# Patient Record
Sex: MALE | Race: WHITE | NOT HISPANIC OR LATINO | Employment: UNEMPLOYED | URBAN - NONMETROPOLITAN AREA
[De-identification: names, ages, dates, MRNs, and addresses within clinical notes are randomized per-mention and may not be internally consistent; named-entity substitution may affect disease eponyms.]

---

## 2017-04-01 ENCOUNTER — HOSPITAL ENCOUNTER (EMERGENCY)
Facility: HOSPITAL | Age: 42
Discharge: HOME | End: 2017-04-01
Attending: FAMILY MEDICINE | Admitting: NURSE PRACTITIONER

## 2017-04-01 VITALS
DIASTOLIC BLOOD PRESSURE: 103 MMHG | HEART RATE: 85 BPM | HEIGHT: 72 IN | RESPIRATION RATE: 18 BRPM | OXYGEN SATURATION: 99 % | BODY MASS INDEX: 38.6 KG/M2 | WEIGHT: 285 LBS | SYSTOLIC BLOOD PRESSURE: 151 MMHG | TEMPERATURE: 98.3 F

## 2017-04-01 DIAGNOSIS — M54.42 CHRONIC MIDLINE LOW BACK PAIN WITH BILATERAL SCIATICA: Primary | ICD-10-CM

## 2017-04-01 DIAGNOSIS — G89.29 CHRONIC MIDLINE LOW BACK PAIN WITH BILATERAL SCIATICA: Primary | ICD-10-CM

## 2017-04-01 DIAGNOSIS — M54.41 CHRONIC MIDLINE LOW BACK PAIN WITH BILATERAL SCIATICA: Primary | ICD-10-CM

## 2017-04-01 LAB
ALBUMIN SERPL-MCNC: 4.6 G/DL (ref 3.5–5)
ALBUMIN/GLOB SERPL: 1.6 G/DL (ref 1.5–2.5)
ALP SERPL-CCNC: 57 U/L (ref 40–129)
ALT SERPL W P-5'-P-CCNC: 25 U/L (ref 10–44)
AMPHET+METHAMPHET UR QL: NEGATIVE
ANION GAP SERPL CALCULATED.3IONS-SCNC: 4.5 MMOL/L (ref 3.6–11.2)
AST SERPL-CCNC: 20 U/L (ref 10–34)
BARBITURATES UR QL SCN: NEGATIVE
BASOPHILS # BLD AUTO: 0.03 10*3/MM3 (ref 0–0.3)
BASOPHILS NFR BLD AUTO: 0.3 % (ref 0–2)
BENZODIAZ UR QL SCN: NEGATIVE
BILIRUB SERPL-MCNC: 0.7 MG/DL (ref 0.2–1.8)
BILIRUB UR QL STRIP: NEGATIVE
BUN BLD-MCNC: 10 MG/DL (ref 7–21)
BUN/CREAT SERPL: 9.2 (ref 7–25)
BUPRENORPHINE+NOR UR QL SCN: NEGATIVE
CALCIUM SPEC-SCNC: 9.2 MG/DL (ref 7.7–10)
CANNABINOIDS SERPL QL: NEGATIVE
CHLORIDE SERPL-SCNC: 104 MMOL/L (ref 99–112)
CLARITY UR: CLEAR
CO2 SERPL-SCNC: 28.5 MMOL/L (ref 24.3–31.9)
COCAINE UR QL: NEGATIVE
COLOR UR: YELLOW
CREAT BLD-MCNC: 1.09 MG/DL (ref 0.43–1.29)
DEPRECATED RDW RBC AUTO: 42.7 FL (ref 37–54)
EOSINOPHIL # BLD AUTO: 0.11 10*3/MM3 (ref 0–0.7)
EOSINOPHIL NFR BLD AUTO: 1.1 % (ref 0–5)
ERYTHROCYTE [DISTWIDTH] IN BLOOD BY AUTOMATED COUNT: 13.1 % (ref 11.5–14.5)
GFR SERPL CREATININE-BSD FRML MDRD: 75 ML/MIN/1.73
GLOBULIN UR ELPH-MCNC: 2.9 GM/DL
GLUCOSE BLD-MCNC: 175 MG/DL (ref 70–110)
GLUCOSE UR STRIP-MCNC: ABNORMAL MG/DL
HCT VFR BLD AUTO: 50.4 % (ref 42–52)
HGB BLD-MCNC: 17.1 G/DL (ref 14–18)
HGB UR QL STRIP.AUTO: NEGATIVE
IMM GRANULOCYTES # BLD: 0.07 10*3/MM3 (ref 0–0.03)
IMM GRANULOCYTES NFR BLD: 0.7 % (ref 0–0.5)
KETONES UR QL STRIP: ABNORMAL
LEUKOCYTE ESTERASE UR QL STRIP.AUTO: NEGATIVE
LYMPHOCYTES # BLD AUTO: 2.74 10*3/MM3 (ref 1–3)
LYMPHOCYTES NFR BLD AUTO: 26.7 % (ref 21–51)
MCH RBC QN AUTO: 30.6 PG (ref 27–33)
MCHC RBC AUTO-ENTMCNC: 33.9 G/DL (ref 33–37)
MCV RBC AUTO: 90.2 FL (ref 80–94)
METHADONE UR QL SCN: NEGATIVE
MONOCYTES # BLD AUTO: 1.23 10*3/MM3 (ref 0.1–0.9)
MONOCYTES NFR BLD AUTO: 12 % (ref 0–10)
NEUTROPHILS # BLD AUTO: 6.07 10*3/MM3 (ref 1.4–6.5)
NEUTROPHILS NFR BLD AUTO: 59.2 % (ref 30–70)
NITRITE UR QL STRIP: NEGATIVE
OPIATES UR QL: NEGATIVE
OSMOLALITY SERPL CALC.SUM OF ELEC: 277.1 MOSM/KG (ref 273–305)
OXYCODONE UR QL SCN: NEGATIVE
PCP UR QL SCN: NEGATIVE
PH UR STRIP.AUTO: 5.5 [PH] (ref 5–8)
PLATELET # BLD AUTO: 219 10*3/MM3 (ref 130–400)
PMV BLD AUTO: 10.9 FL (ref 6–10)
POTASSIUM BLD-SCNC: 3.8 MMOL/L (ref 3.5–5.3)
PROPOXYPH UR QL: NEGATIVE
PROT SERPL-MCNC: 7.5 G/DL (ref 6–8)
PROT UR QL STRIP: NEGATIVE
RBC # BLD AUTO: 5.59 10*6/MM3 (ref 4.7–6.1)
SODIUM BLD-SCNC: 137 MMOL/L (ref 135–153)
SP GR UR STRIP: >1.03 (ref 1–1.03)
UROBILINOGEN UR QL STRIP: ABNORMAL
WBC NRBC COR # BLD: 10.25 10*3/MM3 (ref 4.5–12.5)

## 2017-04-01 PROCEDURE — 85025 COMPLETE CBC W/AUTO DIFF WBC: CPT | Performed by: NURSE PRACTITIONER

## 2017-04-01 PROCEDURE — 81003 URINALYSIS AUTO W/O SCOPE: CPT | Performed by: NURSE PRACTITIONER

## 2017-04-01 PROCEDURE — 99283 EMERGENCY DEPT VISIT LOW MDM: CPT

## 2017-04-01 PROCEDURE — 80307 DRUG TEST PRSMV CHEM ANLYZR: CPT | Performed by: NURSE PRACTITIONER

## 2017-04-01 PROCEDURE — 25010000002 ORPHENADRINE CITRATE PER 60 MG: Performed by: NURSE PRACTITIONER

## 2017-04-01 PROCEDURE — 80053 COMPREHEN METABOLIC PANEL: CPT | Performed by: NURSE PRACTITIONER

## 2017-04-01 PROCEDURE — 25010000002 KETOROLAC TROMETHAMINE PER 15 MG: Performed by: NURSE PRACTITIONER

## 2017-04-01 PROCEDURE — 96372 THER/PROPH/DIAG INJ SC/IM: CPT

## 2017-04-01 RX ORDER — PAROXETINE HYDROCHLORIDE 20 MG/1
40 TABLET, FILM COATED ORAL EVERY MORNING
Status: ON HOLD | COMMUNITY
End: 2018-04-03

## 2017-04-01 RX ORDER — ONDANSETRON 4 MG/1
4 TABLET, ORALLY DISINTEGRATING ORAL ONCE
Status: COMPLETED | OUTPATIENT
Start: 2017-04-01 | End: 2017-04-01

## 2017-04-01 RX ORDER — BUPRENORPHINE 2 MG/1
2 TABLET SUBLINGUAL DAILY
Status: ON HOLD | COMMUNITY
End: 2018-04-03

## 2017-04-01 RX ORDER — HYDROCODONE BITARTRATE AND ACETAMINOPHEN 10; 325 MG/1; MG/1
1 TABLET ORAL ONCE
Status: COMPLETED | OUTPATIENT
Start: 2017-04-01 | End: 2017-04-01

## 2017-04-01 RX ORDER — KETOROLAC TROMETHAMINE 30 MG/ML
60 INJECTION, SOLUTION INTRAMUSCULAR; INTRAVENOUS ONCE
Status: COMPLETED | OUTPATIENT
Start: 2017-04-01 | End: 2017-04-01

## 2017-04-01 RX ORDER — ORPHENADRINE CITRATE 30 MG/ML
60 INJECTION INTRAMUSCULAR; INTRAVENOUS ONCE
Status: COMPLETED | OUTPATIENT
Start: 2017-04-01 | End: 2017-04-01

## 2017-04-01 RX ORDER — HYDROCODONE BITARTRATE AND ACETAMINOPHEN 7.5; 325 MG/1; MG/1
1 TABLET ORAL EVERY 6 HOURS PRN
Qty: 10 TABLET | Refills: 0 | Status: SHIPPED | OUTPATIENT
Start: 2017-04-01 | End: 2018-03-21

## 2017-04-01 RX ORDER — METHOCARBAMOL 100 MG/ML
500 INJECTION, SOLUTION INTRAMUSCULAR; INTRAVENOUS ONCE
Status: DISCONTINUED | OUTPATIENT
Start: 2017-04-01 | End: 2017-04-01

## 2017-04-01 RX ADMIN — KETOROLAC TROMETHAMINE 60 MG: 60 INJECTION, SOLUTION INTRAMUSCULAR at 14:06

## 2017-04-01 RX ADMIN — ONDANSETRON 4 MG: 4 TABLET, ORALLY DISINTEGRATING ORAL at 15:07

## 2017-04-01 RX ADMIN — HYDROCODONE BITARTRATE AND ACETAMINOPHEN 1 TABLET: 10; 325 TABLET ORAL at 15:09

## 2017-04-01 RX ADMIN — ORPHENADRINE CITRATE 60 MG: 30 INJECTION INTRAMUSCULAR; INTRAVENOUS at 14:08

## 2017-04-01 NOTE — DISCHARGE INSTRUCTIONS

## 2017-04-03 NOTE — ED PROVIDER NOTES
Subjective   HPI Comments: Patient reports that he had spinal injections a few days ago, since then his pain has been intolerable.  He has an appointment at the pain clinic on Monday to get started on a new regimen for his pain management.      Patient is a 41 y.o. male presenting with back pain.   History provided by:  Patient  Back Pain   Location:  Lumbar spine  Quality:  Shooting and stabbing  Radiates to:  L thigh and R thigh  Pain severity:  Severe  Pain is:  Same all the time  Timing:  Constant  Progression:  Waxing and waning  Chronicity:  Chronic  Context comment:  Recent spinal injections.   Relieved by:  Nothing  Worsened by:  Movement  Ineffective treatments:  Ibuprofen, lying down, heating pad, bed rest, OTC medications and NSAIDs  Associated symptoms: no abdominal pain, no chest pain, no dysuria and no fever        Review of Systems   Constitutional: Negative.  Negative for fever.   HENT: Negative.    Respiratory: Negative.    Cardiovascular: Negative.  Negative for chest pain.   Gastrointestinal: Negative.  Negative for abdominal pain.   Endocrine: Negative.    Genitourinary: Negative.  Negative for dysuria.   Musculoskeletal: Positive for back pain.   Skin: Negative.    Neurological: Negative.    Psychiatric/Behavioral: Negative.    All other systems reviewed and are negative.      Past Medical History:   Diagnosis Date   • Arthritis    • Asthma    • Hyperlipidemia    • Hypertension    • Injury of back    • Kidney stone    • Migraine    • Pancreatitis        Allergies   Allergen Reactions   • Allopurinol        Past Surgical History:   Procedure Laterality Date   • COSMETIC SURGERY     • DENTAL PROCEDURE     • FRACTURE SURGERY      left arm humerous bone        Family History   Problem Relation Age of Onset   • No Known Problems Mother    • No Known Problems Father        Social History     Social History   • Marital status: Single     Spouse name: N/A   • Number of children: N/A   • Years of  education: N/A     Social History Main Topics   • Smoking status: Never Smoker   • Smokeless tobacco: Former User   • Alcohol use No   • Drug use: No   • Sexual activity: Yes     Partners: Female     Other Topics Concern   • None     Social History Narrative       Objective   Physical Exam   Constitutional: He is oriented to person, place, and time. He appears well-developed and well-nourished. No distress.   HENT:   Head: Normocephalic and atraumatic.   Right Ear: External ear normal.   Left Ear: External ear normal.   Nose: Nose normal.   Eyes: Conjunctivae and EOM are normal. Pupils are equal, round, and reactive to light.   Neck: Normal range of motion. Neck supple. No JVD present. No tracheal deviation present.   Cardiovascular: Normal rate, regular rhythm and normal heart sounds.    No murmur heard.  Pulmonary/Chest: Effort normal and breath sounds normal. No respiratory distress. He has no wheezes.   Abdominal: Soft. Bowel sounds are normal. There is no tenderness.   Musculoskeletal: Normal range of motion. He exhibits no edema or deformity.   Neurological: He is alert and oriented to person, place, and time. No cranial nerve deficit.   Skin: Skin is warm and dry. No rash noted. He is not diaphoretic. No erythema. No pallor.   Psychiatric: He has a normal mood and affect. His behavior is normal. Thought content normal.   Nursing note and vitals reviewed.      Procedures         ED Course  ED Course            MDM  Number of Diagnoses or Management Options  Chronic midline low back pain with bilateral sciatica: established and worsening     Amount and/or Complexity of Data Reviewed  Clinical lab tests: ordered and reviewed    Risk of Complications, Morbidity, and/or Mortality  Presenting problems: moderate  Diagnostic procedures: moderate  Management options: low        Final diagnoses:   Chronic midline low back pain with bilateral sciatica            Jody Gregg, APRN  04/02/17 0798

## 2017-04-27 ENCOUNTER — HOSPITAL ENCOUNTER (EMERGENCY)
Facility: HOSPITAL | Age: 42
Discharge: HOME | End: 2017-04-27
Attending: EMERGENCY MEDICINE | Admitting: EMERGENCY MEDICINE

## 2017-04-27 VITALS
DIASTOLIC BLOOD PRESSURE: 90 MMHG | OXYGEN SATURATION: 94 % | HEART RATE: 89 BPM | SYSTOLIC BLOOD PRESSURE: 141 MMHG | BODY MASS INDEX: 39.28 KG/M2 | WEIGHT: 290 LBS | HEIGHT: 72 IN | TEMPERATURE: 97.1 F | RESPIRATION RATE: 18 BRPM

## 2017-04-27 DIAGNOSIS — M54.42 CHRONIC MIDLINE LOW BACK PAIN WITH BILATERAL SCIATICA: Primary | ICD-10-CM

## 2017-04-27 DIAGNOSIS — M54.41 CHRONIC MIDLINE LOW BACK PAIN WITH BILATERAL SCIATICA: Primary | ICD-10-CM

## 2017-04-27 DIAGNOSIS — G89.29 CHRONIC MIDLINE LOW BACK PAIN WITH BILATERAL SCIATICA: Primary | ICD-10-CM

## 2017-04-27 PROCEDURE — 96372 THER/PROPH/DIAG INJ SC/IM: CPT

## 2017-04-27 PROCEDURE — 99283 EMERGENCY DEPT VISIT LOW MDM: CPT

## 2017-04-27 PROCEDURE — 25010000002 METHYLPREDNISOLONE PER 125 MG: Performed by: PHYSICIAN ASSISTANT

## 2017-04-27 PROCEDURE — 25010000002 KETOROLAC TROMETHAMINE PER 15 MG: Performed by: PHYSICIAN ASSISTANT

## 2017-04-27 RX ORDER — KETOROLAC TROMETHAMINE 30 MG/ML
60 INJECTION, SOLUTION INTRAMUSCULAR; INTRAVENOUS ONCE
Status: COMPLETED | OUTPATIENT
Start: 2017-04-27 | End: 2017-04-27

## 2017-04-27 RX ORDER — METHYLPREDNISOLONE SODIUM SUCCINATE 125 MG/2ML
125 INJECTION, POWDER, LYOPHILIZED, FOR SOLUTION INTRAMUSCULAR; INTRAVENOUS ONCE
Status: COMPLETED | OUTPATIENT
Start: 2017-04-27 | End: 2017-04-27

## 2017-04-27 RX ORDER — TRAMADOL HYDROCHLORIDE 50 MG/1
50 TABLET ORAL EVERY 6 HOURS PRN
Qty: 15 TABLET | Refills: 0 | Status: SHIPPED | OUTPATIENT
Start: 2017-04-27 | End: 2018-03-21

## 2017-04-27 RX ADMIN — METHYLPREDNISOLONE SODIUM SUCCINATE 125 MG: 125 INJECTION, POWDER, FOR SOLUTION INTRAMUSCULAR; INTRAVENOUS at 12:51

## 2017-04-27 RX ADMIN — KETOROLAC TROMETHAMINE 60 MG: 60 INJECTION, SOLUTION INTRAMUSCULAR at 12:53

## 2017-06-17 ENCOUNTER — HOSPITAL ENCOUNTER (EMERGENCY)
Facility: HOSPITAL | Age: 42
Discharge: HOME | End: 2017-06-17
Attending: EMERGENCY MEDICINE | Admitting: EMERGENCY MEDICINE

## 2017-06-17 VITALS
SYSTOLIC BLOOD PRESSURE: 145 MMHG | DIASTOLIC BLOOD PRESSURE: 90 MMHG | WEIGHT: 280 LBS | BODY MASS INDEX: 37.93 KG/M2 | OXYGEN SATURATION: 98 % | TEMPERATURE: 98 F | HEIGHT: 72 IN | RESPIRATION RATE: 18 BRPM | HEART RATE: 80 BPM

## 2017-06-17 DIAGNOSIS — F41.1 GENERALIZED ANXIETY DISORDER: Primary | ICD-10-CM

## 2017-06-17 PROCEDURE — 99283 EMERGENCY DEPT VISIT LOW MDM: CPT

## 2017-06-17 RX ORDER — LORAZEPAM 1 MG/1
1 TABLET ORAL ONCE
Status: COMPLETED | OUTPATIENT
Start: 2017-06-17 | End: 2017-06-17

## 2017-06-17 RX ORDER — HYDROXYZINE PAMOATE 50 MG/1
50 CAPSULE ORAL 3 TIMES DAILY PRN
Qty: 30 CAPSULE | Refills: 0 | Status: ON HOLD | OUTPATIENT
Start: 2017-06-17 | End: 2018-04-03

## 2017-06-17 RX ADMIN — LORAZEPAM 1 MG: 1 TABLET ORAL at 13:43

## 2017-06-17 NOTE — ED PROVIDER NOTES
Subjective   Patient is a 42 y.o. male presenting with anxiety.   History provided by:  Patient   used: No    Anxiety   Presents for initial visit. Episode onset: years. The problem has been unchanged. Symptoms include insomnia, irritability, nervous/anxious behavior and restlessness. Patient reports no chest pain, dizziness, nausea or shortness of breath. Symptoms occur most days. The severity of symptoms is moderate. The quality of sleep is fair. Nighttime awakenings: occasional.     There are no known risk factors. His past medical history is significant for anxiety/panic attacks. Treatments tried: has been on klonopin for years, recently had to switch PCP and has not had it in 1 month. Compliance with prior treatments has been good.       Review of Systems   Constitutional: Positive for irritability. Negative for activity change and fever.   HENT: Negative for congestion and sore throat.    Eyes: Negative for pain.   Respiratory: Negative for cough, shortness of breath and wheezing.    Cardiovascular: Negative for chest pain.   Gastrointestinal: Negative for abdominal distention, diarrhea, nausea and vomiting.   Genitourinary: Negative for difficulty urinating and dysuria.   Musculoskeletal: Negative for arthralgias and myalgias.   Skin: Negative for rash and wound.   Neurological: Negative for dizziness and headaches.   Psychiatric/Behavioral: Negative for agitation. The patient is nervous/anxious and has insomnia.    All other systems reviewed and are negative.      Past Medical History:   Diagnosis Date   • Arthritis    • Asthma    • Hyperlipidemia    • Hypertension    • Injury of back    • Kidney stone    • Migraine    • Pancreatitis        Allergies   Allergen Reactions   • Allopurinol        Past Surgical History:   Procedure Laterality Date   • COSMETIC SURGERY     • DENTAL PROCEDURE     • FRACTURE SURGERY      left arm humerous bone        Family History   Problem Relation Age of  Onset   • No Known Problems Mother    • No Known Problems Father        Social History     Social History   • Marital status: Single     Spouse name: N/A   • Number of children: N/A   • Years of education: N/A     Social History Main Topics   • Smoking status: Never Smoker   • Smokeless tobacco: Current User     Types: Snuff   • Alcohol use No   • Drug use: No   • Sexual activity: Yes     Partners: Female     Other Topics Concern   • None     Social History Narrative           Objective   Physical Exam   Constitutional: He is oriented to person, place, and time. He appears well-developed and well-nourished.   HENT:   Head: Normocephalic and atraumatic.   Eyes: EOM are normal. Pupils are equal, round, and reactive to light.   Neck: Normal range of motion. Neck supple.   Cardiovascular: Normal rate, regular rhythm and normal heart sounds.    Pulmonary/Chest: Effort normal and breath sounds normal.   Abdominal: Soft. Bowel sounds are normal.   Musculoskeletal: Normal range of motion.   Neurological: He is alert and oriented to person, place, and time.   Skin: Skin is warm and dry.   Psychiatric: He has a normal mood and affect. His behavior is normal. Judgment and thought content normal.   Nursing note and vitals reviewed.      Procedures         ED Course  ED Course                  MDM  Number of Diagnoses or Management Options  Generalized anxiety disorder:      Amount and/or Complexity of Data Reviewed  Clinical lab tests: ordered and reviewed  Tests in the radiology section of CPT®: reviewed and ordered  Tests in the medicine section of CPT®: reviewed and ordered    Patient Progress  Patient progress: stable      Final diagnoses:   Generalized anxiety disorder            ALO Martini  06/17/17 4774

## 2017-06-17 NOTE — ED NOTES
Amb to Rm 407 with c/o anxious feeling, states has no Klonopin x 1 month d/t changing MD's and insurance changes and has an appointment with Psychiatrist on June 22nd; c/o sleep walking last night and fall. Calm and cooperative at this time.     Meka Albarran RN  06/17/17 3525

## 2018-03-21 ENCOUNTER — HOSPITAL ENCOUNTER (EMERGENCY)
Facility: HOSPITAL | Age: 43
Discharge: HOME | End: 2018-03-21
Attending: EMERGENCY MEDICINE | Admitting: EMERGENCY MEDICINE

## 2018-03-21 VITALS
HEIGHT: 72 IN | DIASTOLIC BLOOD PRESSURE: 99 MMHG | HEART RATE: 101 BPM | BODY MASS INDEX: 39.28 KG/M2 | RESPIRATION RATE: 18 BRPM | TEMPERATURE: 97 F | WEIGHT: 290 LBS | SYSTOLIC BLOOD PRESSURE: 150 MMHG | OXYGEN SATURATION: 99 %

## 2018-03-21 DIAGNOSIS — M1A.0721 IDIOPATHIC CHRONIC GOUT OF LEFT FOOT WITH TOPHUS: Primary | ICD-10-CM

## 2018-03-21 PROCEDURE — 25010000002 METHYLPREDNISOLONE PER 125 MG: Performed by: PHYSICIAN ASSISTANT

## 2018-03-21 PROCEDURE — 25010000002 KETOROLAC TROMETHAMINE PER 15 MG: Performed by: PHYSICIAN ASSISTANT

## 2018-03-21 PROCEDURE — 99283 EMERGENCY DEPT VISIT LOW MDM: CPT

## 2018-03-21 PROCEDURE — 96372 THER/PROPH/DIAG INJ SC/IM: CPT

## 2018-03-21 RX ORDER — HYDROCODONE BITARTRATE AND ACETAMINOPHEN 7.5; 325 MG/1; MG/1
1 TABLET ORAL EVERY 6 HOURS PRN
Qty: 10 TABLET | Refills: 0 | Status: ON HOLD | OUTPATIENT
Start: 2018-03-21 | End: 2018-04-03

## 2018-03-21 RX ORDER — PREDNISONE 20 MG/1
20 TABLET ORAL 3 TIMES DAILY
Qty: 15 TABLET | Refills: 0 | Status: SHIPPED | OUTPATIENT
Start: 2018-03-21 | End: 2018-03-26

## 2018-03-21 RX ORDER — INDOMETHACIN 25 MG/1
25 CAPSULE ORAL 3 TIMES DAILY PRN
Qty: 10 CAPSULE | Refills: 0 | Status: ON HOLD | OUTPATIENT
Start: 2018-03-21 | End: 2018-04-03

## 2018-03-21 RX ORDER — METHYLPREDNISOLONE SODIUM SUCCINATE 125 MG/2ML
125 INJECTION, POWDER, LYOPHILIZED, FOR SOLUTION INTRAMUSCULAR; INTRAVENOUS ONCE
Status: COMPLETED | OUTPATIENT
Start: 2018-03-21 | End: 2018-03-21

## 2018-03-21 RX ORDER — HYDROCODONE BITARTRATE AND ACETAMINOPHEN 7.5; 325 MG/1; MG/1
1 TABLET ORAL EVERY 6 HOURS PRN
Qty: 10 TABLET | Refills: 0 | Status: SHIPPED | OUTPATIENT
Start: 2018-03-21 | End: 2018-03-21

## 2018-03-21 RX ORDER — KETOROLAC TROMETHAMINE 10 MG/1
10 TABLET, FILM COATED ORAL EVERY 6 HOURS PRN
Qty: 10 TABLET | Refills: 0 | Status: SHIPPED | OUTPATIENT
Start: 2018-03-21 | End: 2018-03-21

## 2018-03-21 RX ORDER — KETOROLAC TROMETHAMINE 30 MG/ML
60 INJECTION, SOLUTION INTRAMUSCULAR; INTRAVENOUS ONCE
Status: COMPLETED | OUTPATIENT
Start: 2018-03-21 | End: 2018-03-21

## 2018-03-21 RX ADMIN — KETOROLAC TROMETHAMINE 60 MG: 60 INJECTION, SOLUTION INTRAMUSCULAR at 13:21

## 2018-03-21 RX ADMIN — METHYLPREDNISOLONE SODIUM SUCCINATE 125 MG: 125 INJECTION, POWDER, FOR SOLUTION INTRAMUSCULAR; INTRAVENOUS at 13:23

## 2018-03-21 NOTE — ED PROVIDER NOTES
Subjective   This is a 42-year-old male comes in with chief complaint left foot pain in mainly for the past 4 days.  Patient does state he has a history of gout.  States he is seeing Dr. 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  Does has surgery scheduled in with one week.  Patient describes pain as throbbing, pressure.  Does have a history of hypertension, hyperlipidemia, pancreatitis, arthritis.        History provided by:  Patient   used: No    Lower Extremity Issue   Location:  Foot  Time since incident:  2 days  Injury: no    Foot location:  L foot  Pain details:     Quality:  Aching, shooting and throbbing    Severity:  Moderate    Onset quality:  Sudden    Duration:  2 days    Timing:  Intermittent    Progression:  Worsening  Chronicity:  New  Dislocation: no    Foreign body present:  No foreign bodies  Tetanus status:  Unknown  Prior injury to area:  No  Relieved by:  Nothing  Worsened by:  Nothing  Ineffective treatments:  None tried  Associated symptoms: decreased ROM, stiffness and swelling    Associated symptoms: no itching and no numbness    Risk factors: no concern for non-accidental trauma, no frequent fractures, no obesity and no recent illness        Review of Systems   Musculoskeletal: Positive for arthralgias, joint swelling, myalgias and stiffness.   Skin: Negative for itching, rash and wound.   All other systems reviewed and are negative.      Past Medical History:   Diagnosis Date   • Arthritis    • Asthma    • Hyperlipidemia    • Hypertension    • Injury of back    • Kidney stone    • Migraine    • Pancreatitis        Allergies   Allergen Reactions   • Allopurinol Rash       Past Surgical History:   Procedure Laterality Date   • COSMETIC SURGERY     • DENTAL PROCEDURE     • FRACTURE SURGERY      left arm humerous bone        Family History   Problem Relation Age of Onset   • No Known Problems Mother    • No Known Problems Father        Social History     Social  History   • Marital status: Single     Social History Main Topics   • Smoking status: Never Smoker   • Smokeless tobacco: Current User     Types: Snuff   • Alcohol use No   • Drug use: No   • Sexual activity: Yes     Partners: Female     Other Topics Concern   • Not on file           Objective   Physical Exam   Constitutional: He is oriented to person, place, and time. He appears well-developed and well-nourished.   HENT:   Head: Normocephalic.   Right Ear: External ear normal.   Left Ear: External ear normal.   Nose: Nose normal.   Mouth/Throat: Oropharynx is clear and moist.   Eyes: Conjunctivae and EOM are normal. Pupils are equal, round, and reactive to light. Right eye exhibits no discharge. Left eye exhibits no discharge.   Neck: Normal range of motion. Neck supple. No thyromegaly present.   Cardiovascular: Normal rate, regular rhythm, normal heart sounds and intact distal pulses.    Pulmonary/Chest: Effort normal and breath sounds normal.   Abdominal: Soft. Bowel sounds are normal.   Musculoskeletal: He exhibits tenderness.        Left ankle: He exhibits swelling and ecchymosis. Tenderness. Medial malleolus tenderness found.        Lymphadenopathy:     He has no cervical adenopathy.   Neurological: He is alert and oriented to person, place, and time. He has normal reflexes.   Skin: Skin is warm and dry. Capillary refill takes less than 2 seconds. Bruising and ecchymosis noted. He is not diaphoretic.   Psychiatric: He has a normal mood and affect. His behavior is normal. Judgment and thought content normal.   Nursing note and vitals reviewed.      Procedures         ED Course  ED Course   Comment By Time   PAtient kacie report shows subxone. Will treat with steroids.  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

## 2018-04-02 ENCOUNTER — HOSPITAL ENCOUNTER (EMERGENCY)
Facility: HOSPITAL | Age: 43
Discharge: STILL ADMITTED OR ADMINISTRATIVE | End: 2018-04-03
Attending: EMERGENCY MEDICINE

## 2018-04-02 DIAGNOSIS — R45.851 SUICIDAL IDEATIONS: ICD-10-CM

## 2018-04-02 DIAGNOSIS — F41.1 GENERALIZED ANXIETY DISORDER: ICD-10-CM

## 2018-04-02 DIAGNOSIS — F33.0 MILD EPISODE OF RECURRENT MAJOR DEPRESSIVE DISORDER (HCC): Primary | ICD-10-CM

## 2018-04-03 ENCOUNTER — HOSPITAL ENCOUNTER (INPATIENT)
Facility: HOSPITAL | Age: 43
LOS: 4 days | Discharge: HOME | End: 2018-04-07
Attending: PSYCHIATRY & NEUROLOGY | Admitting: PSYCHIATRY & NEUROLOGY

## 2018-04-03 VITALS
TEMPERATURE: 98.1 F | OXYGEN SATURATION: 98 % | BODY MASS INDEX: 39.01 KG/M2 | HEIGHT: 72 IN | HEART RATE: 81 BPM | WEIGHT: 288 LBS | DIASTOLIC BLOOD PRESSURE: 106 MMHG | SYSTOLIC BLOOD PRESSURE: 142 MMHG | RESPIRATION RATE: 18 BRPM

## 2018-04-03 PROBLEM — E78.5 HYPERLIPIDEMIA: Status: ACTIVE | Noted: 2018-04-03

## 2018-04-03 PROBLEM — G89.4 CHRONIC PAIN DISORDER: Status: ACTIVE | Noted: 2018-04-03

## 2018-04-03 PROBLEM — F33.2 SEVERE EPISODE OF RECURRENT MAJOR DEPRESSIVE DISORDER, WITHOUT PSYCHOTIC FEATURES (HCC): Status: ACTIVE | Noted: 2018-04-03

## 2018-04-03 PROBLEM — R45.851 SUICIDAL IDEATION: Status: ACTIVE | Noted: 2018-04-03

## 2018-04-03 PROBLEM — F11.20 OPIATE DEPENDENCE, CONTINUOUS (HCC): Status: ACTIVE | Noted: 2018-04-03

## 2018-04-03 PROBLEM — Z98.890 STATUS POST LEFT FOOT SURGERY: Status: ACTIVE | Noted: 2018-04-03

## 2018-04-03 PROBLEM — I10 HYPERTENSION: Status: ACTIVE | Noted: 2018-04-03

## 2018-04-03 PROBLEM — M51.9 LUMBAR DISC DISEASE: Status: ACTIVE | Noted: 2018-04-03

## 2018-04-03 LAB
6-ACETYL MORPHINE: NEGATIVE
ALBUMIN SERPL-MCNC: 4.3 G/DL (ref 3.5–5)
ALBUMIN/GLOB SERPL: 1.5 G/DL (ref 1.5–2.5)
ALP SERPL-CCNC: 43 U/L (ref 40–129)
ALT SERPL W P-5'-P-CCNC: 46 U/L (ref 10–44)
AMPHET+METHAMPHET UR QL: NEGATIVE
ANION GAP SERPL CALCULATED.3IONS-SCNC: 8.3 MMOL/L (ref 3.6–11.2)
AST SERPL-CCNC: 28 U/L (ref 10–34)
BARBITURATES UR QL SCN: NEGATIVE
BASOPHILS # BLD AUTO: 0.03 10*3/MM3 (ref 0–0.3)
BASOPHILS NFR BLD AUTO: 0.2 % (ref 0–2)
BENZODIAZ UR QL SCN: NEGATIVE
BILIRUB SERPL-MCNC: 0.3 MG/DL (ref 0.2–1.8)
BILIRUB UR QL STRIP: NEGATIVE
BUN BLD-MCNC: 12 MG/DL (ref 7–21)
BUN/CREAT SERPL: 9.4 (ref 7–25)
BUPRENORPHINE SERPL-MCNC: NEGATIVE NG/ML
CALCIUM SPEC-SCNC: 9.6 MG/DL (ref 7.7–10)
CANNABINOIDS SERPL QL: NEGATIVE
CHLORIDE SERPL-SCNC: 102 MMOL/L (ref 99–112)
CLARITY UR: CLEAR
CO2 SERPL-SCNC: 25.7 MMOL/L (ref 24.3–31.9)
COCAINE UR QL: NEGATIVE
COLOR UR: YELLOW
CREAT BLD-MCNC: 1.28 MG/DL (ref 0.43–1.29)
DEPRECATED RDW RBC AUTO: 41.5 FL (ref 37–54)
EOSINOPHIL # BLD AUTO: 0.1 10*3/MM3 (ref 0–0.7)
EOSINOPHIL NFR BLD AUTO: 0.8 % (ref 0–5)
ERYTHROCYTE [DISTWIDTH] IN BLOOD BY AUTOMATED COUNT: 12.6 % (ref 11.5–14.5)
ETHANOL BLD-MCNC: <10 MG/DL
ETHANOL UR QL: <0.01 %
GFR SERPL CREATININE-BSD FRML MDRD: 62 ML/MIN/1.73
GLOBULIN UR ELPH-MCNC: 2.9 GM/DL
GLUCOSE BLD-MCNC: 133 MG/DL (ref 70–110)
GLUCOSE UR STRIP-MCNC: NEGATIVE MG/DL
HCT VFR BLD AUTO: 47.6 % (ref 42–52)
HGB BLD-MCNC: 16.1 G/DL (ref 14–18)
HGB UR QL STRIP.AUTO: NEGATIVE
IMM GRANULOCYTES # BLD: 0.08 10*3/MM3 (ref 0–0.03)
IMM GRANULOCYTES NFR BLD: 0.6 % (ref 0–0.5)
KETONES UR QL STRIP: NEGATIVE
LEUKOCYTE ESTERASE UR QL STRIP.AUTO: NEGATIVE
LYMPHOCYTES # BLD AUTO: 3.64 10*3/MM3 (ref 1–3)
LYMPHOCYTES NFR BLD AUTO: 29.5 % (ref 21–51)
MCH RBC QN AUTO: 30.9 PG (ref 27–33)
MCHC RBC AUTO-ENTMCNC: 33.8 G/DL (ref 33–37)
MCV RBC AUTO: 91.4 FL (ref 80–94)
METHADONE UR QL SCN: NEGATIVE
MONOCYTES # BLD AUTO: 1.35 10*3/MM3 (ref 0.1–0.9)
MONOCYTES NFR BLD AUTO: 10.9 % (ref 0–10)
NEUTROPHILS # BLD AUTO: 7.14 10*3/MM3 (ref 1.4–6.5)
NEUTROPHILS NFR BLD AUTO: 58 % (ref 30–70)
NITRITE UR QL STRIP: NEGATIVE
OPIATES UR QL: NEGATIVE
OSMOLALITY SERPL CALC.SUM OF ELEC: 273.6 MOSM/KG (ref 273–305)
OXYCODONE UR QL SCN: POSITIVE
PCP UR QL SCN: NEGATIVE
PH UR STRIP.AUTO: 7 [PH] (ref 5–8)
PLATELET # BLD AUTO: 215 10*3/MM3 (ref 130–400)
PMV BLD AUTO: 10.6 FL (ref 6–10)
POTASSIUM BLD-SCNC: 4 MMOL/L (ref 3.5–5.3)
PROT SERPL-MCNC: 7.2 G/DL (ref 6–8)
PROT UR QL STRIP: NEGATIVE
RBC # BLD AUTO: 5.21 10*6/MM3 (ref 4.7–6.1)
SODIUM BLD-SCNC: 136 MMOL/L (ref 135–153)
SP GR UR STRIP: 1.01 (ref 1–1.03)
UROBILINOGEN UR QL STRIP: NORMAL
WBC NRBC COR # BLD: 12.34 10*3/MM3 (ref 4.5–12.5)

## 2018-04-03 PROCEDURE — 80307 DRUG TEST PRSMV CHEM ANLYZR: CPT | Performed by: PHYSICIAN ASSISTANT

## 2018-04-03 PROCEDURE — 80053 COMPREHEN METABOLIC PANEL: CPT | Performed by: PHYSICIAN ASSISTANT

## 2018-04-03 PROCEDURE — 93005 ELECTROCARDIOGRAM TRACING: CPT | Performed by: PSYCHIATRY & NEUROLOGY

## 2018-04-03 PROCEDURE — 85025 COMPLETE CBC W/AUTO DIFF WBC: CPT | Performed by: PHYSICIAN ASSISTANT

## 2018-04-03 PROCEDURE — 93010 ELECTROCARDIOGRAM REPORT: CPT | Performed by: INTERNAL MEDICINE

## 2018-04-03 PROCEDURE — 99223 1ST HOSP IP/OBS HIGH 75: CPT | Performed by: PSYCHIATRY & NEUROLOGY

## 2018-04-03 PROCEDURE — 81003 URINALYSIS AUTO W/O SCOPE: CPT | Performed by: PHYSICIAN ASSISTANT

## 2018-04-03 RX ORDER — PAROXETINE HYDROCHLORIDE 20 MG/1
20 TABLET, FILM COATED ORAL DAILY
Status: COMPLETED | OUTPATIENT
Start: 2018-04-03 | End: 2018-04-03

## 2018-04-03 RX ORDER — CEPHALEXIN 500 MG/1
500 CAPSULE ORAL 4 TIMES DAILY
Status: DISCONTINUED | OUTPATIENT
Start: 2018-04-03 | End: 2018-04-07 | Stop reason: HOSPADM

## 2018-04-03 RX ORDER — ACETAMINOPHEN 325 MG/1
650 TABLET ORAL EVERY 4 HOURS PRN
Status: DISCONTINUED | OUTPATIENT
Start: 2018-04-03 | End: 2018-04-03

## 2018-04-03 RX ORDER — ACETAMINOPHEN 325 MG/1
650 TABLET ORAL EVERY 6 HOURS PRN
Status: DISCONTINUED | OUTPATIENT
Start: 2018-04-03 | End: 2018-04-04

## 2018-04-03 RX ORDER — FENOFIBRATE 134 MG/1
134 CAPSULE ORAL
COMMUNITY

## 2018-04-03 RX ORDER — IBUPROFEN 800 MG/1
800 TABLET ORAL EVERY 8 HOURS PRN
COMMUNITY
End: 2018-04-07 | Stop reason: HOSPADM

## 2018-04-03 RX ORDER — TRAZODONE HYDROCHLORIDE 50 MG/1
50 TABLET ORAL NIGHTLY PRN
Status: DISCONTINUED | OUTPATIENT
Start: 2018-04-03 | End: 2018-04-03

## 2018-04-03 RX ORDER — CEPHALEXIN 500 MG/1
500 CAPSULE ORAL 4 TIMES DAILY
COMMUNITY
Start: 2018-03-29 | End: 2018-04-08

## 2018-04-03 RX ORDER — IBUPROFEN 600 MG/1
600 TABLET ORAL EVERY 6 HOURS PRN
Status: DISCONTINUED | OUTPATIENT
Start: 2018-04-03 | End: 2018-04-04

## 2018-04-03 RX ORDER — ALUMINA, MAGNESIA, AND SIMETHICONE 2400; 2400; 240 MG/30ML; MG/30ML; MG/30ML
15 SUSPENSION ORAL EVERY 6 HOURS PRN
Status: DISCONTINUED | OUTPATIENT
Start: 2018-04-03 | End: 2018-04-07 | Stop reason: HOSPADM

## 2018-04-03 RX ORDER — CLONIDINE HYDROCHLORIDE 0.2 MG/1
0.2 TABLET ORAL AS NEEDED
COMMUNITY

## 2018-04-03 RX ORDER — BUPRENORPHINE 2 MG/1
8 TABLET SUBLINGUAL ONCE
Status: COMPLETED | OUTPATIENT
Start: 2018-04-03 | End: 2018-04-03

## 2018-04-03 RX ORDER — TRAZODONE HYDROCHLORIDE 50 MG/1
150 TABLET ORAL NIGHTLY
Status: DISCONTINUED | OUTPATIENT
Start: 2018-04-03 | End: 2018-04-07 | Stop reason: HOSPADM

## 2018-04-03 RX ORDER — LOPERAMIDE HYDROCHLORIDE 2 MG/1
2 CAPSULE ORAL 4 TIMES DAILY PRN
Status: DISCONTINUED | OUTPATIENT
Start: 2018-04-03 | End: 2018-04-07 | Stop reason: HOSPADM

## 2018-04-03 RX ORDER — BENZTROPINE MESYLATE 1 MG/ML
0.5 INJECTION INTRAMUSCULAR; INTRAVENOUS DAILY PRN
Status: DISCONTINUED | OUTPATIENT
Start: 2018-04-03 | End: 2018-04-07 | Stop reason: HOSPADM

## 2018-04-03 RX ORDER — PAROXETINE HYDROCHLORIDE 20 MG/1
40 TABLET, FILM COATED ORAL DAILY
Status: CANCELLED | OUTPATIENT
Start: 2018-04-03

## 2018-04-03 RX ORDER — CLONAZEPAM 0.5 MG/1
0.5 TABLET ORAL EVERY 8 HOURS PRN
Status: ON HOLD | COMMUNITY
End: 2018-04-07

## 2018-04-03 RX ORDER — MIRTAZAPINE 15 MG/1
15 TABLET, FILM COATED ORAL NIGHTLY
Status: DISCONTINUED | OUTPATIENT
Start: 2018-04-03 | End: 2018-04-04

## 2018-04-03 RX ORDER — FAMOTIDINE 20 MG/1
20 TABLET, FILM COATED ORAL 2 TIMES DAILY PRN
Status: DISCONTINUED | OUTPATIENT
Start: 2018-04-03 | End: 2018-04-07 | Stop reason: HOSPADM

## 2018-04-03 RX ORDER — BENZTROPINE MESYLATE 1 MG/1
1 TABLET ORAL DAILY PRN
Status: DISCONTINUED | OUTPATIENT
Start: 2018-04-03 | End: 2018-04-07 | Stop reason: HOSPADM

## 2018-04-03 RX ORDER — ONDANSETRON 4 MG/1
4 TABLET, FILM COATED ORAL EVERY 6 HOURS PRN
Status: DISCONTINUED | OUTPATIENT
Start: 2018-04-03 | End: 2018-04-07 | Stop reason: HOSPADM

## 2018-04-03 RX ORDER — GABAPENTIN 300 MG/1
600 CAPSULE ORAL 3 TIMES DAILY
Status: DISCONTINUED | OUTPATIENT
Start: 2018-04-03 | End: 2018-04-07 | Stop reason: HOSPADM

## 2018-04-03 RX ORDER — CLONIDINE HYDROCHLORIDE 0.2 MG/1
0.2 TABLET ORAL 2 TIMES DAILY
Status: DISCONTINUED | OUTPATIENT
Start: 2018-04-03 | End: 2018-04-07 | Stop reason: HOSPADM

## 2018-04-03 RX ORDER — OXYCODONE HYDROCHLORIDE AND ACETAMINOPHEN 5; 325 MG/1; MG/1
1 TABLET ORAL EVERY 6 HOURS PRN
COMMUNITY
End: 2018-04-07 | Stop reason: HOSPADM

## 2018-04-03 RX ORDER — BUPRENORPHINE 2 MG/1
8 TABLET SUBLINGUAL ONCE
Status: COMPLETED | OUTPATIENT
Start: 2018-04-04 | End: 2018-04-04

## 2018-04-03 RX ORDER — ACETAMINOPHEN 325 MG/1
650 TABLET ORAL EVERY 6 HOURS PRN
Status: DISCONTINUED | OUTPATIENT
Start: 2018-04-03 | End: 2018-04-03 | Stop reason: SDUPTHER

## 2018-04-03 RX ORDER — OXYCODONE HYDROCHLORIDE AND ACETAMINOPHEN 5; 325 MG/1; MG/1
1 TABLET ORAL EVERY 6 HOURS PRN
Status: CANCELLED | OUTPATIENT
Start: 2018-04-03

## 2018-04-03 RX ORDER — FLUTICASONE PROPIONATE 50 MCG
2 SPRAY, SUSPENSION (ML) NASAL DAILY
Status: DISCONTINUED | OUTPATIENT
Start: 2018-04-03 | End: 2018-04-05

## 2018-04-03 RX ORDER — BENZONATATE 100 MG/1
100 CAPSULE ORAL 3 TIMES DAILY PRN
Status: DISCONTINUED | OUTPATIENT
Start: 2018-04-03 | End: 2018-04-07 | Stop reason: HOSPADM

## 2018-04-03 RX ORDER — NICOTINE 21 MG/24HR
1 PATCH, TRANSDERMAL 24 HOURS TRANSDERMAL ONCE
Status: DISCONTINUED | OUTPATIENT
Start: 2018-04-03 | End: 2018-04-03 | Stop reason: HOSPADM

## 2018-04-03 RX ORDER — GABAPENTIN 300 MG/1
600 CAPSULE ORAL 3 TIMES DAILY
Status: DISCONTINUED | OUTPATIENT
Start: 2018-04-03 | End: 2018-04-03 | Stop reason: SDUPTHER

## 2018-04-03 RX ORDER — TRAZODONE HYDROCHLORIDE 150 MG/1
150 TABLET ORAL NIGHTLY
COMMUNITY
End: 2018-04-07 | Stop reason: HOSPADM

## 2018-04-03 RX ORDER — HYDROXYZINE 50 MG/1
50 TABLET, FILM COATED ORAL EVERY 6 HOURS PRN
Status: DISCONTINUED | OUTPATIENT
Start: 2018-04-03 | End: 2018-04-03

## 2018-04-03 RX ORDER — OXYCODONE HYDROCHLORIDE AND ACETAMINOPHEN 5; 325 MG/1; MG/1
1 TABLET ORAL ONCE
Status: COMPLETED | OUTPATIENT
Start: 2018-04-03 | End: 2018-04-03

## 2018-04-03 RX ORDER — ECHINACEA PURPUREA EXTRACT 125 MG
2 TABLET ORAL AS NEEDED
Status: DISCONTINUED | OUTPATIENT
Start: 2018-04-03 | End: 2018-04-07 | Stop reason: HOSPADM

## 2018-04-03 RX ORDER — NICOTINE 21 MG/24HR
1 PATCH, TRANSDERMAL 24 HOURS TRANSDERMAL EVERY 24 HOURS
Status: DISCONTINUED | OUTPATIENT
Start: 2018-04-03 | End: 2018-04-03

## 2018-04-03 RX ORDER — FLUTICASONE PROPIONATE 50 MCG
2 SPRAY, SUSPENSION (ML) NASAL DAILY
COMMUNITY

## 2018-04-03 RX ORDER — PAROXETINE HYDROCHLORIDE 40 MG/1
40 TABLET, FILM COATED ORAL DAILY
COMMUNITY
End: 2018-04-07 | Stop reason: HOSPADM

## 2018-04-03 RX ORDER — CLONAZEPAM 0.5 MG/1
0.5 TABLET ORAL EVERY 8 HOURS PRN
Status: DISCONTINUED | OUTPATIENT
Start: 2018-04-03 | End: 2018-04-07 | Stop reason: HOSPADM

## 2018-04-03 RX ORDER — IBUPROFEN 600 MG/1
600 TABLET ORAL EVERY 6 HOURS PRN
Status: DISCONTINUED | OUTPATIENT
Start: 2018-04-03 | End: 2018-04-03 | Stop reason: SDUPTHER

## 2018-04-03 RX ORDER — TESTOSTERONE CYPIONATE 200 MG/ML
200 INJECTION, SOLUTION INTRAMUSCULAR
COMMUNITY

## 2018-04-03 RX ADMIN — HYDROXYZINE HYDROCHLORIDE 50 MG: 50 TABLET ORAL at 11:17

## 2018-04-03 RX ADMIN — LOPERAMIDE HYDROCHLORIDE 2 MG: 2 CAPSULE ORAL at 16:51

## 2018-04-03 RX ADMIN — GABAPENTIN 600 MG: 300 CAPSULE ORAL at 20:13

## 2018-04-03 RX ADMIN — PAROXETINE HYDROCHLORIDE 20 MG: 20 TABLET, FILM COATED ORAL at 14:28

## 2018-04-03 RX ADMIN — NICOTINE POLACRILEX 4 MG: 2 GUM, CHEWING ORAL at 23:01

## 2018-04-03 RX ADMIN — FAMOTIDINE 20 MG: 20 TABLET, FILM COATED ORAL at 16:51

## 2018-04-03 RX ADMIN — CEPHALEXIN 500 MG: 500 CAPSULE ORAL at 14:26

## 2018-04-03 RX ADMIN — OXYCODONE HYDROCHLORIDE AND ACETAMINOPHEN 1 TABLET: 5; 325 TABLET ORAL at 01:52

## 2018-04-03 RX ADMIN — NICOTINE POLACRILEX 4 MG: 2 GUM, CHEWING ORAL at 20:14

## 2018-04-03 RX ADMIN — NICOTINE POLACRILEX 4 MG: 2 GUM, CHEWING ORAL at 14:52

## 2018-04-03 RX ADMIN — CEPHALEXIN 500 MG: 500 CAPSULE ORAL at 17:16

## 2018-04-03 RX ADMIN — NICOTINE POLACRILEX 4 MG: 2 GUM, CHEWING ORAL at 16:53

## 2018-04-03 RX ADMIN — NICOTINE POLACRILEX 4 MG: 2 GUM, CHEWING ORAL at 11:17

## 2018-04-03 RX ADMIN — BUPRENORPHINE HCL 8 MG: 2 TABLET SUBLINGUAL at 17:17

## 2018-04-03 RX ADMIN — BENZTROPINE MESYLATE 1 MG: 1 TABLET ORAL at 16:51

## 2018-04-03 RX ADMIN — NICOTINE 1 PATCH: 21 PATCH TRANSDERMAL at 08:57

## 2018-04-03 RX ADMIN — GABAPENTIN 600 MG: 300 CAPSULE ORAL at 12:36

## 2018-04-03 RX ADMIN — NICOTINE 1 PATCH: 21 PATCH TRANSDERMAL at 01:51

## 2018-04-03 RX ADMIN — CLONIDINE HYDROCHLORIDE 0.2 MG: 0.2 TABLET ORAL at 14:27

## 2018-04-03 RX ADMIN — CLONAZEPAM 0.5 MG: 0.5 TABLET ORAL at 12:37

## 2018-04-03 RX ADMIN — CEPHALEXIN 500 MG: 500 CAPSULE ORAL at 20:13

## 2018-04-03 RX ADMIN — ONDANSETRON 4 MG: 4 TABLET, FILM COATED ORAL at 16:51

## 2018-04-03 NOTE — NURSING NOTE
PT RECENTLY HAD SURGURY TO LEFT FOOT-FOOT IS WRAPPED WITH ACE WRAP WHICH WAS REMOVED IN ER-INTAKE DEPT.  HOWEVER GAUZE UNDER ACE IS TO REMAIN IN PLACE UNTIL MD REMOVES.  RN ALERTED LEAD NURSE OF THIS AND LEAD NURSE INFORMED RN TO PLACE A NOTE IN CHART.

## 2018-04-03 NOTE — NURSING NOTE
"Intake assessment completed. Pt brought in by his wife with feelings of suicidal ideation, no plan. Reports feeling this way for 2-3 weeks. States, \"I feel like a burden on everybody. I've been out of work for 6 years, I was trying to get my disability, but I got turned down.\" Denies Hi, A/V hallucinations. No delusional statements voiced. Rates depress at 9/10; anxiety 7/10. Reports not having slept in 2 weeks, poor appetite. Reports stressors as being out of work, having chronic back, bilateral knee and left shoulder pain, financial issues. Denies drug abuse. Pt was in [facility] for 4 years. Prior to that he hurt his back in the [place] at age 24 and was given opiates for pain. He currently quit taking Suboxone on 3/25/18 so that he could have surgery on his left foot related to gout. Currently he is taking Percocet 5mg PO q 4 hours PRN. His last dose was 5pm today. He drinks 2 beers about once a month when out to dinner. He denies withdrawal and does not feel he abuses drugs or ETOH. Pt is alert and oriented x 4. Rates pain to his left foot at 9 on 1-10 scale. ED provider made aware. Foot remains elevated. Wrap to left foot/lower extremity removed and searched for contraband. None found. Toes are pink. Capillary refill < 3 seconds. Intake process explained to patient. Any questions answered. Pt placed in Tx room. Safety maintained.   "

## 2018-04-03 NOTE — PLAN OF CARE
Problem: Patient Care Overview  Goal: Plan of Care Review   04/03/18 0345   Coping/Psychosocial   Plan of Care Reviewed With patient   Coping/Psychosocial   Patient Agreement with Plan of Care agrees   Plan of Care Review   Progress no change   OTHER   Outcome Summary Pt presented to floor calm and cooperative , complains of some pain.. pt has chronic pain.

## 2018-04-03 NOTE — NURSING NOTE
Reviewed patient, lab work and V/S with Dr. Aguilar. Admission orders read back and verified x 2. ED provider and patient made aware.

## 2018-04-03 NOTE — ED PROVIDER NOTES
Subjective     Mental Health Problem   Presenting symptoms: depression and suicidal thoughts    Presenting symptoms: no aggressive behavior, no agitation, no bizarre behavior, no delusions, no disorganized speech, no disorganized thought process, no hallucinations, no homicidal ideas, no paranoid behavior, no self-mutilation, no suicidal threats and no suicide attempt    Patient accompanied by: Psych intake nurse.  Degree of incapacity (severity):  Moderate  Onset quality:  Gradual  Duration:  3 months  Timing:  Constant  Progression:  Worsening  Chronicity:  Chronic  Context: medication    Context: not alcohol use, not drug abuse, not noncompliant, not recent medication change and not stressful life event    Context comment:  Takes paxil and hydroxyzine and klonopin  Treatment compliance:  All of the time  Time since last psychoactive medication taken:  1 day  Relieved by:  Nothing  Worsened by:  Nothing  Ineffective treatments:  Antidepressants and anti-anxiety medications  Associated symptoms: anxiety and feelings of worthlessness    Associated symptoms: no abdominal pain, no anhedonia, no appetite change, no chest pain, no decreased need for sleep, not distractible, no euphoric mood, no fatigue, no headaches, no hypersomnia, no hyperventilation, no insomnia, no irritability, no poor judgment, no school problems and no weight change    Risk factors: family hx of mental illness and hx of mental illness    Risk factors: no family violence, no hx of suicide attempts, no neurological disease and no recent psychiatric admission        Review of Systems   Constitutional: Negative.  Negative for appetite change, fatigue, fever and irritability.   HENT: Negative.    Respiratory: Negative.    Cardiovascular: Negative.  Negative for chest pain.   Gastrointestinal: Negative.  Negative for abdominal pain.   Endocrine: Negative.    Genitourinary: Negative.  Negative for dysuria.   Skin: Negative.    Neurological: Negative.   Negative for headaches.   Psychiatric/Behavioral: Positive for suicidal ideas. Negative for agitation, behavioral problems, confusion, decreased concentration, dysphoric mood, hallucinations, homicidal ideas, paranoia, self-injury and sleep disturbance. The patient is nervous/anxious. The patient does not have insomnia and is not hyperactive.    All other systems reviewed and are negative.      Past Medical History:   Diagnosis Date   • Anxiety    • Arthritis    • Asthma    • Chronic pain disorder    • Depression    • Hyperlipidemia    • Hypertension    • Injury of back    • Kidney stone    • Migraine    • Pancreatitis    • Sciatica    • Spinal stenosis        Allergies   Allergen Reactions   • Bee Venom Anaphylaxis   • Indomethacin Hives   • Allopurinol Rash   • Uloric [Febuxostat] Rash       Past Surgical History:   Procedure Laterality Date   • COSMETIC SURGERY     • DENTAL PROCEDURE     • FRACTURE SURGERY      left arm humerous bone    • KNEE SURGERY         Family History   Problem Relation Age of Onset   • No Known Problems Mother    • Suicide Attempts Father        Social History     Social History   • Marital status: Single     Social History Main Topics   • Smoking status: Never Smoker   • Smokeless tobacco: Current User     Types: Snuff   • Alcohol use Yes      Comment: Drinks about 2 beers a month   • Drug use:      Types: Oxycodone, Other   • Sexual activity: Yes     Partners: Female     Other Topics Concern   • Not on file           Objective   Physical Exam   Constitutional: He is oriented to person, place, and time. He appears well-developed and well-nourished. No distress.   HENT:   Head: Normocephalic and atraumatic.   Right Ear: External ear normal.   Left Ear: External ear normal.   Nose: Nose normal.   Eyes: Conjunctivae and EOM are normal. Pupils are equal, round, and reactive to light.   Neck: Normal range of motion. Neck supple. No JVD present. No tracheal deviation present.   Cardiovascular:  Normal rate, regular rhythm, normal heart sounds and intact distal pulses.  Exam reveals no gallop and no friction rub.    No murmur heard.  Pulmonary/Chest: Effort normal and breath sounds normal. No respiratory distress. He has no wheezes. He has no rales. He exhibits no tenderness.   Abdominal: Soft. Bowel sounds are normal. He exhibits no distension and no mass. There is no tenderness. There is no rebound and no guarding. No hernia.   Musculoskeletal: Normal range of motion. He exhibits no edema or deformity.   Neurological: He is alert and oriented to person, place, and time. No cranial nerve deficit.   Skin: Skin is warm and dry. No rash noted. He is not diaphoretic. No erythema. No pallor.   Psychiatric: He has a normal mood and affect. His behavior is normal. Thought content normal.   Nursing note and vitals reviewed.      Procedures         ED Course  ED Course   Comment By Time   Patient is medically cleared for psych.  ALO Vigil 04/03 0129   Patient will be discharged to psych under the care of Dr. Aguilar.  ALO Vigil 04/03 0139                  MDM  Number of Diagnoses or Management Options  Generalized anxiety disorder:   Mild episode of recurrent major depressive disorder:   Suicidal ideations:      Amount and/or Complexity of Data Reviewed  Clinical lab tests: ordered and reviewed  Discuss the patient with other providers: yes        Final diagnoses:   Mild episode of recurrent major depressive disorder   Generalized anxiety disorder   Suicidal ideations            ALO Vigil  04/03/18 0140
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
## 2018-04-03 NOTE — PLAN OF CARE
Problem: Patient Care Overview  Goal: Plan of Care Review  Outcome: Ongoing (interventions implemented as appropriate)   04/03/18 1218   Coping/Psychosocial   Plan of Care Reviewed With patient   Coping/Psychosocial   Patient Agreement with Plan of Care agrees   Plan of Care Review   Progress no change   OTHER   Outcome Summary PT RATES ANXIETY 9/10 AND DEPRESSION 8/10. DENIES ANY SI, HI, OR A/V HALLUCINATION. PT IS PREOCCUPIED WIHT PAIN MEDICATIONS, REPORTS EXTREMEM PAIN.        Problem: Overarching Goals (Adult)  Goal: Adheres to Safety Considerations for Self and Others  Outcome: Ongoing (interventions implemented as appropriate)    Goal: Optimized Coping Skills in Response to Life Stressors  Outcome: Ongoing (interventions implemented as appropriate)    Goal: Develops/Participates in Therapeutic [place] to Support Successful Transition  Outcome: Ongoing (interventions implemented as appropriate)

## 2018-04-03 NOTE — PLAN OF CARE
Problem: Patient Care Overview  Goal: Plan of Care Review  Outcome: Ongoing (interventions implemented as appropriate)   04/03/18 1209   Coping/Psychosocial   Plan of Care Reviewed With patient   Coping/Psychosocial   Patient Agreement with Plan of Care agrees   Plan of Care Review   Progress no change   OTHER   Outcome Summary Completed initial assessment, discussed alternative aftercare resources and expectations of treatment; reviewed treatment plan.   Coping/Psychosocial   Consent Given to Review Plan with Wife     Goal: Individualization and Mutuality  Outcome: Ongoing (interventions implemented as appropriate)   04/03/18 1209   Personal Strengths/Vulnerabilities   Patient Personal Strengths expressive of needs;expressive of emotions;family/social support;motivated for treatment   Patient Vulnerabilities Ineffective coping skills, poor insight.   Individualization   Patient Specific Preferences Mood stabilization.   Patient Specific Goals (Include Timeframe) Identify 2 healthy coping skills, deny all SI, Hi, and AVH prior to discharge.   Patient Specific Interventions Illness education, scheduling aftercare.   Mutuality/Individual Preferences   What Anxieties, Fears, Concerns, or Questions Do You Have About Your Care? Chronic pain.   What Information Would Help Us Give You More Personalized Care? None   How Would You and/or Your Support Person Like to Participate in Your Care? None     Goal: Discharge Needs Assessment  Outcome: Ongoing (interventions implemented as appropriate)   04/03/18 1209   Discharge Needs Assessment   Readmission Within the Last 30 Days no previous admission in last 30 days   Concerns to be Addressed coping/stress;decision making;discharge planning;mental health;suicidal   Patient/Family Anticipates Transition to home with family   Patient/Family Anticipated Services at Transition mental health services   Transportation Anticipated family or friend will provide   Patient's Choice of  Community Agency(s) Comp Care   Current Discharge Risk psychiatric illness   Discharge Coordination/Progress Patient anticipated to return home with family and have aftercare scheduled with Comp Care upon discharge. Patient has [employer] insurance.   Discharge Needs Assessment,    Outpatient/Agency/Support Group Needs outpatient medication management;outpatient counseling;outpatient psychiatric care (specify)   Anticipated Discharge Disposition home or self-care   DATA:           Therapist met individually with patient this date to introduce role and to discuss hospitalization expectations. Patient agreeable.        Therapist completed integrated summary, treatment plan, and initiated social history this date. Therapist is strongly recommending a family session prior to discharge.      Therapist to contact patient's wife for collateral and disposition planning.          ASSESSMENT:       Avelino is a 42 year-old ,  [place] who presented to [facility] ER with reports of suicidal ideation.  He reports increased depression for last three weeks with symptoms of loss of interest, low mood, lack of motivation, and feeling sad, hopeless, helpless, and worthless.  Patient discussed stressors of being denied for disability, poor sleep, chronic pain, financial strain, being unemployed, and and medical problems.  Patient rated anxiety as 8/10 and depression as 10/10.  He states he has not slept in two weeks.  Patient reports history of being treated in suboxone clinic and not taking his suboxone since March 2018.  Patient denies history of mental health treatment other than with medication and currently prescribed Paxil for last twenty years.  Patient denies legal issues.  He currently denied SI and reports feeling safe in the hospital.  Patient denied HI and AVH.  He appeared to display blunted affect and tearful during assessment.  He reported depressed mood and appeared to display limited  insight.  He appeared oriented x3.  Patient states he plans to follow up with his PCP in [place] upon discharge.          PLAN:       Treatment team will focus efforts on stabilizing patient's acute symptoms while providing education on healthy coping and crisis management to reduce hospitalizations. Patient requires daily psychiatrist evaluation and 24/7 nursing supervision to promote patient safety.      Therapist will offer 1-4 individual sessions (20-30 minutes each), 1 therapy group daily, family education, and appropriate referral.      Patient anticipated to return home with wife and have aftercare with his PCP.

## 2018-04-03 NOTE — ED NOTES
"When I asked the patient if he was Suicidal, he nodded his head Yes and said, \"I've got a family history of it, I has my wife bring me up here.\"     Cheikh Walsh  04/02/18 2698    "

## 2018-04-04 PROCEDURE — 99232 SBSQ HOSP IP/OBS MODERATE 35: CPT | Performed by: PSYCHIATRY & NEUROLOGY

## 2018-04-04 RX ORDER — MIRTAZAPINE 15 MG/1
30 TABLET, FILM COATED ORAL NIGHTLY
Status: DISCONTINUED | OUTPATIENT
Start: 2018-04-04 | End: 2018-04-07 | Stop reason: HOSPADM

## 2018-04-04 RX ORDER — PAROXETINE HYDROCHLORIDE 20 MG/1
10 TABLET, FILM COATED ORAL DAILY
Status: COMPLETED | OUTPATIENT
Start: 2018-04-04 | End: 2018-04-04

## 2018-04-04 RX ORDER — ACETAMINOPHEN 500 MG
500 TABLET ORAL EVERY 6 HOURS PRN
Status: DISCONTINUED | OUTPATIENT
Start: 2018-04-04 | End: 2018-04-07 | Stop reason: HOSPADM

## 2018-04-04 RX ORDER — PANTOPRAZOLE SODIUM 40 MG/1
40 TABLET, DELAYED RELEASE ORAL
Status: DISCONTINUED | OUTPATIENT
Start: 2018-04-04 | End: 2018-04-07 | Stop reason: HOSPADM

## 2018-04-04 RX ORDER — IBUPROFEN 600 MG/1
600 TABLET ORAL EVERY 6 HOURS PRN
Status: DISCONTINUED | OUTPATIENT
Start: 2018-04-04 | End: 2018-04-07 | Stop reason: HOSPADM

## 2018-04-04 RX ORDER — BUPRENORPHINE 2 MG/1
8 TABLET SUBLINGUAL 2 TIMES DAILY
Status: COMPLETED | OUTPATIENT
Start: 2018-04-04 | End: 2018-04-04

## 2018-04-04 RX ADMIN — GABAPENTIN 600 MG: 300 CAPSULE ORAL at 17:37

## 2018-04-04 RX ADMIN — CLONIDINE HYDROCHLORIDE 0.2 MG: 0.2 TABLET ORAL at 20:52

## 2018-04-04 RX ADMIN — NICOTINE POLACRILEX 4 MG: 2 GUM, CHEWING ORAL at 06:38

## 2018-04-04 RX ADMIN — NICOTINE POLACRILEX 4 MG: 2 GUM, CHEWING ORAL at 02:13

## 2018-04-04 RX ADMIN — FLUTICASONE PROPIONATE 2 SPRAY: 50 SPRAY, METERED NASAL at 08:21

## 2018-04-04 RX ADMIN — CLONAZEPAM 0.5 MG: 0.5 TABLET ORAL at 20:52

## 2018-04-04 RX ADMIN — NICOTINE POLACRILEX 4 MG: 2 GUM, CHEWING ORAL at 21:37

## 2018-04-04 RX ADMIN — NICOTINE POLACRILEX 4 MG: 2 GUM, CHEWING ORAL at 13:45

## 2018-04-04 RX ADMIN — CEPHALEXIN 500 MG: 500 CAPSULE ORAL at 12:16

## 2018-04-04 RX ADMIN — GABAPENTIN 600 MG: 300 CAPSULE ORAL at 20:52

## 2018-04-04 RX ADMIN — MIRTAZAPINE 30 MG: 15 TABLET, FILM COATED ORAL at 20:52

## 2018-04-04 RX ADMIN — CEPHALEXIN 500 MG: 500 CAPSULE ORAL at 17:37

## 2018-04-04 RX ADMIN — PAROXETINE HYDROCHLORIDE 10 MG: 20 TABLET, FILM COATED ORAL at 09:37

## 2018-04-04 RX ADMIN — NICOTINE POLACRILEX 4 MG: 2 GUM, CHEWING ORAL at 18:37

## 2018-04-04 RX ADMIN — BUPRENORPHINE HCL 8 MG: 2 TABLET SUBLINGUAL at 09:37

## 2018-04-04 RX ADMIN — BUPRENORPHINE HCL 8 MG: 2 TABLET SUBLINGUAL at 20:53

## 2018-04-04 RX ADMIN — CEPHALEXIN 500 MG: 500 CAPSULE ORAL at 20:52

## 2018-04-04 RX ADMIN — NICOTINE POLACRILEX 4 MG: 2 GUM, CHEWING ORAL at 09:52

## 2018-04-04 RX ADMIN — CLONIDINE HYDROCHLORIDE 0.2 MG: 0.2 TABLET ORAL at 08:22

## 2018-04-04 RX ADMIN — CEPHALEXIN 500 MG: 500 CAPSULE ORAL at 08:22

## 2018-04-04 RX ADMIN — GABAPENTIN 600 MG: 300 CAPSULE ORAL at 08:22

## 2018-04-04 RX ADMIN — BUPRENORPHINE HCL 8 MG: 2 TABLET SUBLINGUAL at 00:05

## 2018-04-04 NOTE — PLAN OF CARE
Problem: Patient Care Overview  Goal: Plan of Care Review  Outcome: Ongoing (interventions implemented as appropriate)  PT. NOT FOR SURE HOW MANY HOURS HE SLEPT RATES ANX./DEP 7 DENIES ANY S/I INSTRUCTED TO KEEP DRESSING DRY AND TO LEAVE DSG IN PLACE PT. HAD TAKING BANDAGE OFF TODAY& PUT BACK ON  REPORTED BY MAX CENTENO  AND INSTRUCTED PT. NOT TO TAKE OFF HE VERBALIZES  UNDERSTANDING HE VERBALIZES HAD TAKEN IT OFF TO LOOK AT IT    04/04/18 1692   Coping/Psychosocial   Plan of Care Reviewed With patient   Coping/Psychosocial   Patient Agreement with Plan of Care agrees   Plan of Care Review   Progress improving       Problem: Overarching Goals (Adult)  Goal: Adheres to Safety Considerations for Self and Others  Outcome: Ongoing (interventions implemented as appropriate)    Goal: Optimized Coping Skills in Response to Life Stressors  Outcome: Ongoing (interventions implemented as appropriate)    Goal: Develops/Participates in Therapeutic [place] to Support Successful Transition  Outcome: Ongoing (interventions implemented as appropriate)

## 2018-04-04 NOTE — PLAN OF CARE
Problem: Patient Care Overview  Goal: Plan of Care Review   04/04/18 0506   Coping/Psychosocial   Plan of Care Reviewed With patient   Coping/Psychosocial   Patient Agreement with Plan of Care agrees   Plan of Care Review   Progress no change   OTHER   Outcome Summary pt anx 5 dep 7-pt in day room most of shift, snacking all night

## 2018-04-04 NOTE — PLAN OF CARE
Problem: Patient Care Overview  Goal: Interprofessional Rounds/Family Conf  Outcome: Ongoing (interventions implemented as appropriate)   04/04/18 0921   Interdisciplinary Rounds/Family Conf   Summary Treatment team staffing with Dr. Zavala.   Interdisciplinary Rounds/Family Conf   Participants social work;psychiatrist;nursing   Discussed patient to have aftercare with [facility] and return home with family upon discharge.  Discussed patient continues to endorse suicidal thoughts and not safe for discharge today.

## 2018-04-04 NOTE — PROGRESS NOTES
"INPATIENT PSYCHIATRIC PROGRESS NOTE    Name:  Avelino Watson  :  1975  MRN:  [id number]  Visit Number:  [id number]  Length of stay:  1    Behavioral Health Treatment Plan and Problem List: I have reviewed and approved the Behavioral Health Treatment Plan and Problem list.    SUBJECTIVE  CC: \"some better\"     INTERVAL HISTORY: affect has improved, continuing to experience intrusive suicidal thoughts. Notice of denial his third appeal for SSD one week PTA probably \"the last straw\".   Accepting the role reversal has been difficult but tries, feels a burden particularly to his children, sights them as the reason he has not committed suicide.     Discussed pain management meds with the patient, he reports he had been doing well with Suboxone for two years till recent surgery. With surgery RX lost control rapidly. Willing now to go with Bruprenorphine and ibuprofen / acetaminophen when necessary. Will start Suboxone tomorrow.    Depression rating 10/10  Anxiety rating 10/10  Sleep: normally uses a BPAP, sleep restless last nigh, est. Slept 3-4 hours.   Withdrawal sx: diarrhea improving  Cravin/10, justifies with his pain complaint.        Review of Systems   Respiratory: Negative.    Cardiovascular: Negative.    Gastrointestinal: Negative.    Musculoskeletal: Positive for back pain.        Paresthesia right hand , possible CT.    Neurological: Negative.          OBJECTIVE    Temp:  [96.5 °F (35.8 °C)-97.7 °F (36.5 °C)] 97.1 °F (36.2 °C)  Heart Rate:  [] 77  Resp:  [18-20] 18  BP: (117-150)/(77-93) 117/77    MENTAL STATUS EXAM:      Appearance:Casually dressed, good hygeine.   Cooperation:Cooperative  Psychomotor: No psychomotor agitation/retardation, No EPS, No motor tics  Speech-normal rate, amount.   Mood/Affect: Depressed  Thought Processes: associations intact  Thought Content: negativistic   Hallucination(s): none  Hopelessness: Yes  Optimistic:No  Suicidal Thoughts:  moderate  Suicidal " Plan/Intent: none  Homicidal Thoughts:  absent  Orientation: oriented x 3  Memory: recent intact    Lab Results (last 24 hours)     ** No results found for the last 24 hours. **           Imaging Results (last 24 hours)     ** No results found for the last 24 hours. **           ECG/EMG Results (most recent)     Procedure Component Value Units Date/Time    ECG 12 Lead [[id number]] Collected:  04/03/18 0259     Updated:  04/03/18 1120    Narrative:       Test Reason : Potential adverse reaction to medications.  Blood Pressure : **/** mmHG  Vent. Rate : 077 BPM     Atrial Rate : 077 BPM     P-R Int : 152 ms          QRS Dur : 088 ms      QT Int : 360 ms       P-R-T Axes : 016 -13 046 degrees     QTc Int : 407 ms    Normal sinus rhythm  Normal ECG  No previous ECGs available  Confirmed by Jose Sosa ([id number]) on 4/3/2018 11:19:59 AM    Referred By:  SHREYAS           Confirmed By:Jose Sosa           ALLERGIES: Bee venom; Indomethacin; Allopurinol; and Uloric [febuxostat]      Current Facility-Administered Medications:   •  acetaminophen (TYLENOL) tablet 650 mg, 650 mg, Oral, Q6H PRN **AND** ibuprofen (ADVIL,MOTRIN) tablet 600 mg, 600 mg, Oral, Q6H PRN, Andres Zavala MD  •  aluminum-magnesium hydroxide-simethicone (MAALOX MAX) 400-400-40 MG/5ML suspension 15 mL, 15 mL, Oral, Q6H PRN, Bettina Aguilar MD  •  benzonatate (TESSALON) capsule 100 mg, 100 mg, Oral, TID PRN, Bettina Aguilar MD  •  benztropine (COGENTIN) tablet 1 mg, 1 mg, Oral, Daily PRN, 1 mg at 04/03/18 1651 **OR** benztropine (COGENTIN) injection 0.5 mg, 0.5 mg, Intramuscular, Daily PRN, Bettina Aguilar MD  •  cephalexin (KEFLEX) capsule 500 mg, 500 mg, Oral, 4x Daily, Andres Zavala MD, 500 mg at 04/03/18 2013  •  clonazePAM (KlonoPIN) tablet 0.5 mg, 0.5 mg, Oral, Q8H PRN, Andres Zavala MD, 0.5 mg at 04/03/18 1237  •  CloNIDine (CATAPRES) tablet 0.2 mg, 0.2 mg, Oral, BID, Andres Zavala MD,  0.2 mg at 04/03/18 1427  •  famotidine (PEPCID) tablet 20 mg, 20 mg, Oral, BID PRN, Bettina Aguilar MD, 20 mg at 04/03/18 1651  •  fluticasone (FLONASE) 50 MCG/ACT nasal spray 2 spray, 2 spray, Nasal, Daily, Andres Zavala MD  •  gabapentin (NEURONTIN) capsule 600 mg, 600 mg, Oral, TID, Andres Zavala MD, 600 mg at 04/03/18 2013  •  loperamide (IMODIUM) capsule 2 mg, 2 mg, Oral, 4x Daily PRN, Bettina Aguilar MD, 2 mg at 04/03/18 1651  •  magnesium hydroxide (MILK OF MAGNESIA) suspension 2400 mg/10mL 10 mL, 10 mL, Oral, Daily PRN, Bettina Aguilar MD  •  mirtazapine (REMERON) tablet 15 mg, 15 mg, Oral, Nightly, Andres Zavala MD  •  nicotine polacrilex (NICORETTE) gum 4 mg, 4 mg, Mouth/Throat, Q2H PRN, Andres Zavala MD, 4 mg at 04/04/18 0638  •  ondansetron (ZOFRAN) tablet 4 mg, 4 mg, Oral, Q6H PRN, Bettina Aguilar MD, 4 mg at 04/03/18 1651  •  sodium chloride (OCEAN) nasal spray 2 spray, 2 spray, Each Nare, PRN, Bettina Aguilar MD  •  traZODone (DESYREL) tablet 150 mg, 150 mg, Oral, Nightly, Andres Zavala MD    ASSESSMENT & PLAN    Diagnosis Code   • Suicidal ideation R45.851 Plan: Patient is on special precautions    • Severe episode of recurrent major depressive disorder, without psychotic features F33.2 Plan: We will initiate treatment with an antidepressant Remeron plus individual and group psychotherapeutic efforts.     • Opiate dependence, continuous F11.20 Plan: At this point will utilize a combination of ibuprofen with acetaminophen for pain resuming Suboxone possibly tomorrow after clearance of any recent opiates prescribed.     • Lumbar disc disease M51.9 Plan as stated above    • Hyperlipidemia E78.5 Plan: Consider statin therapy    • Hypertension I10 Plan: For now continue the patient's clonidine.     • Chronic pain disorder G89.4 Plan: See above    • Status post left foot surgery Z98.890 Plan: Will request consultation with  Dr. Uribe            Suicide precautions: Suicide precaution Level 3 (q15 min checks)     Behavioral Health Treatment Plan and Problem List: I have reviewed and approved the Behavioral Health Treatment Plan and Problem list.    Clinician:  Andres Zavala MD  04/04/18  8:22 AM

## 2018-04-05 LAB
ANION GAP SERPL CALCULATED.3IONS-SCNC: 4.3 MMOL/L (ref 3.6–11.2)
BUN BLD-MCNC: 13 MG/DL (ref 7–21)
BUN/CREAT SERPL: 9.2 (ref 7–25)
CALCIUM SPEC-SCNC: 9 MG/DL (ref 7.7–10)
CHLORIDE SERPL-SCNC: 101 MMOL/L (ref 99–112)
CHOLEST SERPL-MCNC: 206 MG/DL (ref 0–200)
CO2 SERPL-SCNC: 33.7 MMOL/L (ref 24.3–31.9)
CREAT BLD-MCNC: 1.42 MG/DL (ref 0.43–1.29)
GFR SERPL CREATININE-BSD FRML MDRD: 55 ML/MIN/1.73
GLUCOSE BLD-MCNC: 133 MG/DL (ref 70–110)
HBA1C MFR BLD: 6.6 % (ref 4.5–5.7)
HDLC SERPL-MCNC: 32 MG/DL (ref 60–100)
LDLC SERPL CALC-MCNC: ABNORMAL MG/DL (ref 0–100)
LDLC/HDLC SERPL: ABNORMAL {RATIO}
OSMOLALITY SERPL CALC.SUM OF ELEC: 279.6 MOSM/KG (ref 273–305)
POTASSIUM BLD-SCNC: 4.7 MMOL/L (ref 3.5–5.3)
SODIUM BLD-SCNC: 139 MMOL/L (ref 135–153)
TRIGL SERPL-MCNC: 406 MG/DL (ref 0–150)
VLDLC SERPL-MCNC: ABNORMAL MG/DL

## 2018-04-05 PROCEDURE — 97162 PT EVAL MOD COMPLEX 30 MIN: CPT

## 2018-04-05 PROCEDURE — 80061 LIPID PANEL: CPT | Performed by: PSYCHIATRY & NEUROLOGY

## 2018-04-05 PROCEDURE — 97530 THERAPEUTIC ACTIVITIES: CPT

## 2018-04-05 PROCEDURE — 80048 BASIC METABOLIC PNL TOTAL CA: CPT | Performed by: PSYCHIATRY & NEUROLOGY

## 2018-04-05 PROCEDURE — 83036 HEMOGLOBIN GLYCOSYLATED A1C: CPT | Performed by: PSYCHIATRY & NEUROLOGY

## 2018-04-05 PROCEDURE — 99232 SBSQ HOSP IP/OBS MODERATE 35: CPT | Performed by: PSYCHIATRY & NEUROLOGY

## 2018-04-05 PROCEDURE — 97116 GAIT TRAINING THERAPY: CPT

## 2018-04-05 RX ORDER — NICOTINE 21 MG/24HR
1 PATCH, TRANSDERMAL 24 HOURS TRANSDERMAL DAILY
Status: DISCONTINUED | OUTPATIENT
Start: 2018-04-05 | End: 2018-04-06

## 2018-04-05 RX ORDER — BUPRENORPHINE AND NALOXONE 8; 2 MG/1; MG/1
2 FILM, SOLUBLE BUCCAL; SUBLINGUAL DAILY
Status: DISCONTINUED | OUTPATIENT
Start: 2018-04-05 | End: 2018-04-07 | Stop reason: HOSPADM

## 2018-04-05 RX ORDER — FLUTICASONE PROPIONATE 50 MCG
2 SPRAY, SUSPENSION (ML) NASAL NIGHTLY
Status: DISCONTINUED | OUTPATIENT
Start: 2018-04-05 | End: 2018-04-07 | Stop reason: HOSPADM

## 2018-04-05 RX ADMIN — CLONIDINE HYDROCHLORIDE 0.2 MG: 0.2 TABLET ORAL at 09:00

## 2018-04-05 RX ADMIN — NICOTINE POLACRILEX 4 MG: 2 GUM, CHEWING ORAL at 07:42

## 2018-04-05 RX ADMIN — Medication 2 SPRAY: at 20:20

## 2018-04-05 RX ADMIN — PANTOPRAZOLE SODIUM 40 MG: 40 TABLET, DELAYED RELEASE ORAL at 06:21

## 2018-04-05 RX ADMIN — BUPRENORPHINE HYDROCHLORIDE, NALOXONE HYDROCHLORIDE 2 FILM: 8; 2 FILM, SOLUBLE BUCCAL; SUBLINGUAL at 09:01

## 2018-04-05 RX ADMIN — IBUPROFEN 600 MG: 600 TABLET ORAL at 15:22

## 2018-04-05 RX ADMIN — CEPHALEXIN 500 MG: 500 CAPSULE ORAL at 11:08

## 2018-04-05 RX ADMIN — CEPHALEXIN 500 MG: 500 CAPSULE ORAL at 17:25

## 2018-04-05 RX ADMIN — CEPHALEXIN 500 MG: 500 CAPSULE ORAL at 20:18

## 2018-04-05 RX ADMIN — CEPHALEXIN 500 MG: 500 CAPSULE ORAL at 07:41

## 2018-04-05 RX ADMIN — IBUPROFEN 600 MG: 600 TABLET ORAL at 20:18

## 2018-04-05 RX ADMIN — CLONAZEPAM 0.5 MG: 0.5 TABLET ORAL at 07:42

## 2018-04-05 RX ADMIN — GABAPENTIN 600 MG: 300 CAPSULE ORAL at 15:22

## 2018-04-05 RX ADMIN — GABAPENTIN 600 MG: 300 CAPSULE ORAL at 20:20

## 2018-04-05 RX ADMIN — CLONIDINE HYDROCHLORIDE 0.2 MG: 0.2 TABLET ORAL at 20:18

## 2018-04-05 RX ADMIN — MIRTAZAPINE 30 MG: 15 TABLET, FILM COATED ORAL at 20:18

## 2018-04-05 RX ADMIN — NICOTINE 1 PATCH: 21 PATCH TRANSDERMAL at 11:05

## 2018-04-05 RX ADMIN — CLONAZEPAM 0.5 MG: 0.5 TABLET ORAL at 15:22

## 2018-04-05 RX ADMIN — GABAPENTIN 600 MG: 300 CAPSULE ORAL at 09:00

## 2018-04-05 NOTE — PROGRESS NOTES
"INPATIENT PSYCHIATRIC PROGRESS NOTE    Name:  Avelino Watson  :  1975  MRN:  [id number]  Visit Number:  [id number]  Length of stay:  2    Behavioral Health Treatment Plan and Problem List: I have reviewed and approved the Behavioral Health Treatment Plan and Problem list.    SUBJECTIVE  CC: \"Some better\"    INTERVAL HISTORY: depression improving slowly, finds brief periods when the depression lifts but overall feeling hopeless with suicidal thoughts. Participating in the groups, psychotherapy will be an important component of tx on going post hospital.   The impact of the SSD denial significant. Just might consider pursuing 6 hours towards a college degree.     Depression rating 7/10  Anxiety rating 7/10  Sleep: 6 hours (an improvement).     Pain management: Suboxone with Ibuprofen/acetaphetamine prn.        Review of Systems   Respiratory: Negative.    Cardiovascular: Negative.    Gastrointestinal: Negative.    Musculoskeletal: Positive for back pain.   Neurological: Negative.          OBJECTIVE    Temp:  [97.4 °F (36.3 °C)-98.1 °F (36.7 °C)] 98.1 °F (36.7 °C)  Heart Rate:  [76-97] 97  Resp:  [18] 18  BP: (117-134)/(76-86) 133/76    MENTAL STATUS EXAM:      Appearance:Casually dressed, good hygeine.   Cooperation:Cooperative  Psychomotor: No psychomotor agitation/retardation, No EPS, No motor tics  Speech-normal rate, amount.   Mood/Affect: Depressed  Thought Processes: associations intact  Thought Content: negativistic   Hallucination(s): none  Hopelessness: Yes  Optimistic:minimally  Suicidal Thoughts:  Trace, intermittant  Suicidal Plan/Intent: none  Homicidal Thoughts:  absent  Orientation: oriented x 3  Memory: recent intact    Lab Results (last 24 hours)     Procedure Component Value Units Date/Time    Hemoglobin A1c [[id number]]  (Abnormal) Collected:  18    Specimen:  Blood Updated:  18 0639     Hemoglobin A1C 6.60 (H) %     Lipid Panel [[id number]]  (Abnormal) Collected:  18 " 0513    Specimen:  Blood Updated:  04/05/18 0615     Total Cholesterol 206 (H) mg/dL      Triglycerides 406 (H) mg/dL      HDL Cholesterol 32 (L) mg/dL      LDL Cholesterol  -- mg/dL      Comment: Unable to calculate        VLDL Cholesterol -- mg/dL      Comment: Unable to calculate        LDL/HDL Ratio --     Comment: Unable to calculate       Narrative:       Cholesterol Reference Ranges  (U.S. Department of Health and Human Services ATP III Classifications)    Desirable          <200 mg/dL  Borderline High    200-239 mg/dL  High Risk          >240 mg/dL      Triglyceride Reference Ranges  (U.S. Department of Health and Human Services ATP III Classifications)    Normal           <150 mg/dL  Borderline High  150-199 mg/dL  High             200-499 mg/dL  Very High        >500 mg/dL    HDL Reference Ranges  (U.S. Department of Health and Human Services ATP III Classifcations)    Low     <40 mg/dl (major risk factor for CHD)  High    >60 mg/dl ('negative' risk factor for CHD)        LDL Reference Ranges  (U.S. Department of Health and Human Services ATP III Classifcations)    Optimal          <100 mg/dL  Near Optimal     100-129 mg/dL  Borderline High  130-159 mg/dL  High             160-189 mg/dL  Very High        >189 mg/dL    Basic Metabolic Panel [[id number]]  (Abnormal) Collected:  04/05/18 0513    Specimen:  Blood Updated:  04/05/18 0615     Glucose 133 (H) mg/dL      BUN 13 mg/dL      Creatinine 1.42 (H) mg/dL      Sodium 139 mmol/L      Potassium 4.7 mmol/L      Chloride 101 mmol/L      CO2 33.7 (H) mmol/L      Calcium 9.0 mg/dL      eGFR Non African Amer 55 (L) mL/min/1.73      BUN/Creatinine Ratio 9.2     Anion Gap 4.3 mmol/L     Narrative:       GFR Normal >60  Chronic Kidney Disease <60  Kidney Failure <15    Osmolality, Calculated [[id number]]  (Normal) Collected:  04/05/18 0513    Specimen:  Blood Updated:  04/05/18 0615     Osmolality Calc 279.6 mOsm/kg            Imaging Results (last 24 hours)     **  No results found for the last 24 hours. **           ECG/EMG Results (most recent)     Procedure Component Value Units Date/Time    ECG 12 Lead [[id number]] Collected:  04/03/18 0259     Updated:  04/03/18 1120    Narrative:       Test Reason : Potential adverse reaction to medications.  Blood Pressure : **/** mmHG  Vent. Rate : 077 BPM     Atrial Rate : 077 BPM     P-R Int : 152 ms          QRS Dur : 088 ms      QT Int : 360 ms       P-R-T Axes : 016 -13 046 degrees     QTc Int : 407 ms    Normal sinus rhythm  Normal ECG  No previous ECGs available  Confirmed by Jose Sosa ([id number]) on 4/3/2018 11:19:59 AM    Referred By:  SHREYAS           Confirmed By:Jose Sosa           ALLERGIES: Bee venom; Indomethacin; Allopurinol; and Uloric [febuxostat]      Current Facility-Administered Medications:   •  acetaminophen (TYLENOL) tablet 500 mg, 500 mg, Oral, Q6H PRN **AND** ibuprofen (ADVIL,MOTRIN) tablet 600 mg, 600 mg, Oral, Q6H PRN, Andres Zavala MD  •  aluminum-magnesium hydroxide-simethicone (MAALOX MAX) 400-400-40 MG/5ML suspension 15 mL, 15 mL, Oral, Q6H PRN, Bettina Aguilar MD  •  benzonatate (TESSALON) capsule 100 mg, 100 mg, Oral, TID PRN, Bettina Aguilar MD  •  benztropine (COGENTIN) tablet 1 mg, 1 mg, Oral, Daily PRN, 1 mg at 04/03/18 1651 **OR** benztropine (COGENTIN) injection 0.5 mg, 0.5 mg, Intramuscular, Daily PRN, Bettina Aguilar MD  •  cephalexin (KEFLEX) capsule 500 mg, 500 mg, Oral, 4x Daily, Andres Zavala MD, 500 mg at 04/05/18 0741  •  clonazePAM (KlonoPIN) tablet 0.5 mg, 0.5 mg, Oral, Q8H PRN, Andres Zavala MD, 0.5 mg at 04/05/18 0742  •  CloNIDine (CATAPRES) tablet 0.2 mg, 0.2 mg, Oral, BID, Andres Zavala MD, 0.2 mg at 04/04/18 2052  •  famotidine (PEPCID) tablet 20 mg, 20 mg, Oral, BID PRN, Bettina Aguilar MD, 20 mg at 04/03/18 1651  •  fluticasone (FLONASE) 50 MCG/ACT nasal spray 2 spray, 2 spray, Nasal, Daily, Andres  Ammon Zavala MD, 2 spray at 04/04/18 0821  •  gabapentin (NEURONTIN) capsule 600 mg, 600 mg, Oral, TID, Andres Zavala MD, 600 mg at 04/04/18 2052  •  loperamide (IMODIUM) capsule 2 mg, 2 mg, Oral, 4x Daily PRN, Bettina Aguilar MD, 2 mg at 04/03/18 1651  •  magnesium hydroxide (MILK OF MAGNESIA) suspension 2400 mg/10mL 10 mL, 10 mL, Oral, Daily PRN, Bettina Aguilar MD  •  mirtazapine (REMERON) tablet 30 mg, 30 mg, Oral, Nightly, Andres Zavala MD, 30 mg at 04/04/18 2052  •  nicotine polacrilex (NICORETTE) gum 4 mg, 4 mg, Mouth/Throat, Q2H PRN, Andres Zavala MD, 4 mg at 04/05/18 0742  •  ondansetron (ZOFRAN) tablet 4 mg, 4 mg, Oral, Q6H PRN, Bettina Aguilar MD, 4 mg at 04/03/18 1651  •  pantoprazole (PROTONIX) EC tablet 40 mg, 40 mg, Oral, Q AM, Andres Zavala MD, 40 mg at 04/05/18 0621  •  sodium chloride (OCEAN) nasal spray 2 spray, 2 spray, Each Nare, PRN, Bettina Aguilar MD  •  traZODone (DESYREL) tablet 150 mg, 150 mg, Oral, Nightly, Andres Zavala MD    ASSESSMENT & PLAN    Diagnosis Code   • Suicidal ideation R45.851 Plan: Patient is on special precautions    • Severe episode of recurrent major depressive disorder, without psychotic features F33.2 Plan: We will initiate treatment with an antidepressant Remeron plus individual and group psychotherapeutic efforts.     • Opiate dependence, continuous F11.20 Plan: At this point will utilize a combination of ibuprofen with acetaminophen for pain resuming Suboxone       • Lumbar disc disease M51.9 Plan as stated above    • Hyperlipidemia E78.5 Plan: Consider statin therapy    • Hypertension I10 Plan: For now continue the patient's clonidine.     • Chronic pain disorder G89.4 Plan: See above    • Status post left foot surgery Z98.890 Plan: Will request consultation with Dr. Uribe            Suicide precautions: Suicide precaution Level 3 (q15 min checks)     Behavioral Health Treatment Plan  and Problem List: I have reviewed and approved the Behavioral Health Treatment Plan and Problem list.    Clinician:  Andres Zavala MD  04/05/18  8:07 AM

## 2018-04-05 NOTE — THERAPY DISCHARGE NOTE
Acute Care - Physical Therapy Initial Eval/Discharge   [facility]     Patient Name: Avelino Watson  : 1975  MRN: [id number]  Today's Date: 2018   Onset of Illness/Injury or Date of Surgery: 18  Date of Referral to PT: 18  Referring Physician: Dr. Aguilar      Admit Date: 4/3/2018    Visit Dx:  No diagnosis found.  Patient Active Problem List   Diagnosis   • Suicidal ideation   • Severe episode of recurrent major depressive disorder, without psychotic features   • Opiate dependence, continuous   • Lumbar disc disease   • Hyperlipidemia   • Hypertension   • Chronic pain disorder   • Status post left foot surgery     Past Medical History:   Diagnosis Date   • Anxiety    • Arthritis    • Asthma    • Chronic pain disorder    • Depression    • Hyperlipidemia    • Hypertension    • Injury of back    • Kidney stone    • Migraine    • Pancreatitis    • Sciatica    • Spinal stenosis      Past Surgical History:   Procedure Laterality Date   • COSMETIC SURGERY     • DENTAL PROCEDURE     • FRACTURE SURGERY      left arm humerous bone    • KNEE SURGERY            PT ASSESSMENT (last 72 hours)      Physical Therapy Evaluation     Row Name 18 1522          PT Evaluation Time/Intention    Subjective Information no complaints  -BC     Document Type evaluation  -BC     Mode of Treatment individual therapy;physical therapy  -BC     Patient Effort good  -BC     Symptoms Noted During/After Treatment none  -BC     Row Name 18 1522          General Information    Patient Profile Reviewed? yes  -BC     Onset of Illness/Injury or Date of Surgery 18  -BC     Referring Physician Dr. Aguilar  -BC     Patient Observations alert;cooperative;agree to therapy  -BC     Prior Level of Function independent:  -BC     Equipment Currently Used at Home none  -BC     Pertinent History of Current Functional Problem injured  -BC     Risks Reviewed patient:;nausea/vomiting;LOB;dizziness;increased discomfort;change in vital  signs;increased drainage;lines disloged  -BC     Row Name 04/05/18 1522          Relationship/Environment    Lives With spouse  -BC     Row Name 04/05/18 1522          Resource/Environmental Concerns    Current Living Arrangements home/apartment/condo  -BC     Resource/Environmental Concerns none  -BC     Row Name 04/05/18 1522          Cognitive Assessment/Intervention- PT/OT    Orientation Status (Cognition) oriented x 4  -BC     Follows Commands (Cognition) WFL  -BC     Row Name 04/05/18 1522          Mobility Assessment/Treatment    Extremity Weight-bearing Status left lower extremity  -BC     Left Lower Extremity (Weight-bearing Status) non weight-bearing (NWB)  -BC     Row Name 04/05/18 1522          Bed Mobility Assessment/Treatment    Bed Mobility Assessment/Treatment --  -BC     Row Name 04/05/18 1522          Gait/Stairs Assessment/Training    Gait/Stairs Assessment/Training gait/ambulation assistive device;gait/ambulation independence  -BC     [place] Level (Gait) conditional independence  -BC     Assistive Device (Gait) crutches, axillary  -BC     Distance in Feet (Gait) 20  -BC     Maintains Weight-bearing Status (Gait) able to maintain  -BC     Row Name 04/05/18 1522          General ROM    GENERAL ROM COMMENTS WFL  -BC     Row Name 04/05/18 1522          Physical Therapy Clinical Impression    Date of Referral to PT 04/05/18  -BC     Functional Level at Time of Evaluation (PT Clinical Impression) WFL  -BC     Row Name 04/05/18 1522          Positioning and Restraints    Pre-Treatment Position in bed  -BC     Post Treatment Position other  -BC     Other Position return to room independently  -BC       User Key  (r) = Recorded By, (t) = Taken By, (c) = Cosigned By    Initials Name Provider Type    ZAIN You PT Physical Therapist          Physical Therapy Education     Title: PT OT SLP Therapies (Done)     Topic: Physical Therapy (Done)     Point: Mobility training (Done)    Learning  Progress Summary     Learner Status Readiness Method Response Comment Documented by    Patient Done Acceptance E [facility] 04/05/18 1634          Point: Home exercise program (Done)    Learning Progress Summary     Learner Status Readiness Method Response Comment Documented by    Patient Done Acceptance E [facility] 04/05/18 1634          Point: Body mechanics (Done)    Learning Progress Summary     Learner Status Readiness Method Response Comment Documented by    Patient Done Acceptance E [facility] 04/05/18 1634          Point: Precautions (Done)    Learning Progress Summary     Learner Status Readiness Method Response Comment Documented by    Patient Done Acceptance E [facility] 04/05/18 1634                      User Key     Initials Effective Dates Name Provider Type [facility] 03/14/16 -  Eri You, PT Physical Therapist PT                PT Recommendation and Plan  Therapy Frequency (PT Clinical Impression): evaluation only            Time Calculation:         PT Charges     Row Name 04/05/18 1650             Time Calculation    Start Time --   60  -BC      PT Received On 04/05/18  -BC        User Key  (r) = Recorded By, (t) = Taken By, (c) = Cosigned By    Initials Name Provider Type    BC Eri You, PT Physical Therapist          Therapy Charges for Today     Code Description Service Date Service Provider Modifiers Qty    24876428998 HC GAIT TRAINING EA 15 MIN 4/5/2018 Eri You, PT GP 1    50906694024 HC PT EVAL MOD COMPLEXITY 2 4/5/2018 Eri You, PT GP 1    18592172493 HC PT THERAPEUTIC ACT EA 15 MIN 4/5/2018 Eri You, PT GP 1    66106147478 HC PT THER SUPP EA 15 MIN 4/5/2018 Eri You, PT GP 4                    Eri You PT  4/5/2018

## 2018-04-05 NOTE — PLAN OF CARE
Problem: Patient Care Overview  Goal: Plan of Care Review  Outcome: Ongoing (interventions implemented as appropriate)  Pt. Verbalizes had slept 6 hours rates anx.7 dep 7 he verbalizes  feeling hopeless has thoughts to hurt self at times he verbalizes will talk to staff of feelings and thoughts pt. Took dsg of left foot today and reapplied per self  Discussed importance of leaving dsg in place to prevent infection he verbalizes understanding but wanted to take off r/t to see itdiscussed with him DR. Uribe was called and he will see hoim today or in the am dr. Uribe gave order to change dsg iodine wet to dry instructed pt. Not to walk on foot gave wheel chair in the am & ordered crutches pt. Reports couldn't use his crutches r/t pain under arms he has been up walking without uses wheel chair and redirection several times for pt. To use wheel chair nsg staff will assist pt. Has needed pt. Has been using rt foot to move wheel chair around . Pt. Verbalizes    04/05/18 3440   Coping/Psychosocial   Plan of Care Reviewed With patient   Coping/Psychosocial   Patient Agreement with Plan of Care agrees   Plan of Care Review   Progress no change   understanding     Problem: Overarching Goals (Adult)  Goal: Adheres to Safety Considerations for Self and Others  Outcome: Ongoing (interventions implemented as appropriate)    Goal: Optimized Coping Skills in Response to Life Stressors  Outcome: Ongoing (interventions implemented as appropriate)    Goal: Develops/Participates in Therapeutic [place] to Support Successful Transition  Outcome: Ongoing (interventions implemented as appropriate)      Problem: Fall Risk (Adult)  Goal: Identify Related Risk Factors and Signs and Symptoms  Outcome: Ongoing (interventions implemented as appropriate)    Goal: Absence of Fall  Outcome: Ongoing (interventions implemented as appropriate)

## 2018-04-05 NOTE — PLAN OF CARE
Problem: Patient Care Overview  Goal: Plan of Care Review  Outcome: Ongoing (interventions implemented as appropriate)  Patient calm and cooperative this shift. Rates anxiety and depression both a 5. Denies SI/HI or hallucinations.    04/05/18 0527   Coping/Psychosocial   Plan of Care Reviewed With patient   Coping/Psychosocial   Patient Agreement with Plan of Care agrees   Plan of Care Review   Progress no change       Problem: Overarching Goals (Adult)  Goal: Adheres to Safety Considerations for Self and Others  Outcome: Ongoing (interventions implemented as appropriate)    Goal: Optimized Coping Skills in Response to Life Stressors  Outcome: Ongoing (interventions implemented as appropriate)    Goal: Develops/Participates in Therapeutic [place] to Support Successful Transition  Outcome: Ongoing (interventions implemented as appropriate)

## 2018-04-06 PROCEDURE — 99232 SBSQ HOSP IP/OBS MODERATE 35: CPT | Performed by: PSYCHIATRY & NEUROLOGY

## 2018-04-06 RX ADMIN — PANTOPRAZOLE SODIUM 40 MG: 40 TABLET, DELAYED RELEASE ORAL at 05:47

## 2018-04-06 RX ADMIN — CLONIDINE HYDROCHLORIDE 0.2 MG: 0.2 TABLET ORAL at 08:08

## 2018-04-06 RX ADMIN — CEPHALEXIN 500 MG: 500 CAPSULE ORAL at 21:36

## 2018-04-06 RX ADMIN — GABAPENTIN 600 MG: 300 CAPSULE ORAL at 16:00

## 2018-04-06 RX ADMIN — CLONIDINE HYDROCHLORIDE 0.2 MG: 0.2 TABLET ORAL at 21:36

## 2018-04-06 RX ADMIN — CEPHALEXIN 500 MG: 500 CAPSULE ORAL at 12:36

## 2018-04-06 RX ADMIN — NICOTINE POLACRILEX 4 MG: 2 GUM, CHEWING ORAL at 17:47

## 2018-04-06 RX ADMIN — IBUPROFEN 600 MG: 600 TABLET ORAL at 08:02

## 2018-04-06 RX ADMIN — NICOTINE POLACRILEX 4 MG: 2 GUM, CHEWING ORAL at 15:34

## 2018-04-06 RX ADMIN — NICOTINE 1 PATCH: 21 PATCH TRANSDERMAL at 08:09

## 2018-04-06 RX ADMIN — CLONAZEPAM 0.5 MG: 0.5 TABLET ORAL at 14:49

## 2018-04-06 RX ADMIN — TRAZODONE HYDROCHLORIDE 150 MG: 50 TABLET ORAL at 21:37

## 2018-04-06 RX ADMIN — CLONAZEPAM 0.5 MG: 0.5 TABLET ORAL at 05:47

## 2018-04-06 RX ADMIN — IBUPROFEN 600 MG: 600 TABLET ORAL at 14:48

## 2018-04-06 RX ADMIN — BUPRENORPHINE HYDROCHLORIDE, NALOXONE HYDROCHLORIDE 2 FILM: 8; 2 FILM, SOLUBLE BUCCAL; SUBLINGUAL at 08:08

## 2018-04-06 RX ADMIN — MIRTAZAPINE 30 MG: 15 TABLET, FILM COATED ORAL at 21:36

## 2018-04-06 RX ADMIN — NICOTINE POLACRILEX 4 MG: 2 GUM, CHEWING ORAL at 19:58

## 2018-04-06 RX ADMIN — GABAPENTIN 600 MG: 300 CAPSULE ORAL at 08:08

## 2018-04-06 RX ADMIN — CEPHALEXIN 500 MG: 500 CAPSULE ORAL at 17:11

## 2018-04-06 RX ADMIN — Medication 2 SPRAY: at 21:36

## 2018-04-06 RX ADMIN — NICOTINE POLACRILEX 4 MG: 2 GUM, CHEWING ORAL at 13:32

## 2018-04-06 RX ADMIN — GABAPENTIN 600 MG: 300 CAPSULE ORAL at 21:36

## 2018-04-06 RX ADMIN — CEPHALEXIN 500 MG: 500 CAPSULE ORAL at 07:38
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
## 2018-04-06 NOTE — PROGRESS NOTES
"INPATIENT PSYCHIATRIC PROGRESS NOTE    Name:  Avelino Watson  :  1975  MRN:  [id number]  Visit Number:  [id number]  Length of stay:  3    Behavioral Health Treatment Plan and Problem List: I have reviewed and approved the Behavioral Health Treatment Plan and Problem list.    SUBJECTIVE  CC: \"Doing better\"     INTERVAL HISTORY: Starting to reframe his thinking regarding his situation: \"been dealt a s--- hand but I have to make the best of it, go back to school or something like that\". Talking about taking it one day at a time developing a action plan toward recovery.      Will request family session Saturday.    Pain: Suboxone plus prn Ibuprofen/  acetamnophen    Depression rating -5/10  Anxiety rating 7/10  Sleep: 7 hours  Withdrawal sx: none  Craving: \"when my foot starts hurting\"       Review of Systems   Respiratory: Negative.    Cardiovascular: Negative.    Gastrointestinal: Negative.    Musculoskeletal: Positive for back pain.        Foot pain   Neurological: Negative.          OBJECTIVE    Temp:  [97.7 °F (36.5 °C)-98.3 °F (36.8 °C)] 97.7 °F (36.5 °C)  Heart Rate:  [86-94] 86  Resp:  [18] 18  BP: (119-132)/(72-80) 132/80    MENTAL STATUS EXAM:      Appearance:Casually dressed, good hygeine.   Cooperation:Cooperative  Psychomotor: No psychomotor agitation/retardation, No EPS, No motor tics  Speech-normal rate, amount.   Mood/Affect: Depressed  Thought Processes: associations intact  Thought Content: negativistic   Hallucination(s): none  Hopelessness: Yes  Optimistic:minimally  Suicidal Thoughts:  none  Suicidal Plan/Intent: none  Homicidal Thoughts:  absent  Orientation: oriented x 3  Memory: recent intact    Lab Results (last 24 hours)     ** No results found for the last 24 hours. **           Imaging Results (last 24 hours)     ** No results found for the last 24 hours. **           ECG/EMG Results (most recent)     Procedure Component Value Units Date/Time    ECG 12 Lead [[id number]] " Collected:  04/03/18 0259     Updated:  04/03/18 1120    Narrative:       Test Reason : Potential adverse reaction to medications.  Blood Pressure : **/** mmHG  Vent. Rate : 077 BPM     Atrial Rate : 077 BPM     P-R Int : 152 ms          QRS Dur : 088 ms      QT Int : 360 ms       P-R-T Axes : 016 -13 046 degrees     QTc Int : 407 ms    Normal sinus rhythm  Normal ECG  No previous ECGs available  Confirmed by Jose Sosa ([id number]) on 4/3/2018 11:19:59 AM    Referred By:  SHREYAS           Confirmed By:Jose Sosa           ALLERGIES: Bee venom; Indomethacin; Allopurinol; and Uloric [febuxostat]      Current Facility-Administered Medications:   •  acetaminophen (TYLENOL) tablet 500 mg, 500 mg, Oral, Q6H PRN **AND** ibuprofen (ADVIL,MOTRIN) tablet 600 mg, 600 mg, Oral, Q6H PRN, Andres Zavala MD, 600 mg at 04/06/18 0802  •  aluminum-magnesium hydroxide-simethicone (MAALOX MAX) 400-400-40 MG/5ML suspension 15 mL, 15 mL, Oral, Q6H PRN, Bettina Aguilar MD  •  benzonatate (TESSALON) capsule 100 mg, 100 mg, Oral, TID PRN, Bettina Aguilar MD  •  benztropine (COGENTIN) tablet 1 mg, 1 mg, Oral, Daily PRN, 1 mg at 04/03/18 1651 **OR** benztropine (COGENTIN) injection 0.5 mg, 0.5 mg, Intramuscular, Daily PRN, Bettina Aguilar MD  •  buprenorphine-naloxone (SUBOXONE) 8-2 MG film 2 film, 2 film, Sublingual, Daily, Andres Zavala MD, 2 film at 04/06/18 0808  •  cephalexin (KEFLEX) capsule 500 mg, 500 mg, Oral, 4x Daily, Andres Zavala MD, 500 mg at 04/06/18 0738  •  clonazePAM (KlonoPIN) tablet 0.5 mg, 0.5 mg, Oral, Q8H PRN, Andres Zavala MD, 0.5 mg at 04/06/18 0547  •  CloNIDine (CATAPRES) tablet 0.2 mg, 0.2 mg, Oral, BID, Andres Zavala MD, 0.2 mg at 04/06/18 0808  •  famotidine (PEPCID) tablet 20 mg, 20 mg, Oral, BID PRN, Bettina Aguilar MD, 20 mg at 04/03/18 1651  •  fluticasone (FLONASE) 50 MCG/ACT nasal spray 2 spray, 2 spray, Each Nare, Nightly,  Andres Zavala MD, 2 spray at 04/05/18 2020  •  gabapentin (NEURONTIN) capsule 600 mg, 600 mg, Oral, TID, Andres Zavala MD, 600 mg at 04/06/18 0808  •  loperamide (IMODIUM) capsule 2 mg, 2 mg, Oral, 4x Daily PRN, Bettina Aguilar MD, 2 mg at 04/03/18 1651  •  magnesium hydroxide (MILK OF MAGNESIA) suspension 2400 mg/10mL 10 mL, 10 mL, Oral, Daily PRN, Bettina Aguilar MD  •  mirtazapine (REMERON) tablet 30 mg, 30 mg, Oral, Nightly, Andres Zavala MD, 30 mg at 04/05/18 2018  •  nicotine (NICODERM CQ) 21 MG/24HR patch 1 patch, 1 patch, Transdermal, Daily, Andres Zavala MD, 1 patch at 04/06/18 0809  •  ondansetron (ZOFRAN) tablet 4 mg, 4 mg, Oral, Q6H PRN, Bettina Aguilar MD, 4 mg at 04/03/18 1651  •  pantoprazole (PROTONIX) EC tablet 40 mg, 40 mg, Oral, Q AM, Andres Zavala MD, 40 mg at 04/06/18 0547  •  sodium chloride (OCEAN) nasal spray 2 spray, 2 spray, Each Nare, PRN, Bettina Aguilar MD  •  traZODone (DESYREL) tablet 150 mg, 150 mg, Oral, Nightly, Andres Zavala MD    ASSESSMENT & PLAN    Patient Active Problem List      Diagnosis Code   • Suicidal ideation R45.851 Plan: Patient is on special precautions    • Severe episode of recurrent major depressive disorder, without psychotic features F33.2 Plan: We will initiate treatment with an antidepressant Remeron plus individual and group psychotherapeutic efforts.     • Opiate dependence, continuous F11.20 Plan: At this point will utilize a combination of ibuprofen with acetaminophen for pain resuming Suboxone       • Lumbar disc disease M51.9 Plan as stated above    • Hyperlipidemia E78.5 Plan: Consider statin therapy    • Hypertension I10 Plan: For now continue the patient's clonidine.     • Chronic pain disorder G89.4 Plan: See above    • Status post left foot surgery Z98.890 Plan: Will request consultation with Dr. Rony Angela  precautions: Suicide precaution Level 3 (q15 min checks)     Behavioral Health Treatment Plan and Problem List: I have reviewed and approved the Behavioral Health Treatment Plan and Problem list.    Clinician:  Andres Zavala MD  04/06/18  10:17 AM

## 2018-04-06 NOTE — PLAN OF CARE
Problem: Patient Care Overview  Goal: Plan of Care Review  Outcome: Ongoing (interventions implemented as appropriate)  Rated anxiety as 7 and depression as 8. Sean NELSON Was seen by Dr. Uribe for follow-up of foot surgery patient had prior to coming to hospital. Pt. stated he was going to ask if he could be discharged today because he stated he could work on his problems at home just as well as here. Slept only 2-3 hours tonight.   04/06/18 9934   Coping/Psychosocial   Plan of Care Reviewed With patient   Coping/Psychosocial   Patient Agreement with Plan of Care agrees   Plan of Care Review   Progress no change       Problem: Overarching Goals (Adult)  Goal: Adheres to Safety Considerations for Self and Others  Outcome: Ongoing (interventions implemented as appropriate)    Goal: Optimized Coping Skills in Response to Life Stressors  Outcome: Ongoing (interventions implemented as appropriate)    Goal: Develops/Participates in Therapeutic [place] to Support Successful Transition  Outcome: Ongoing (interventions implemented as appropriate)

## 2018-04-06 NOTE — PLAN OF CARE
Problem: Mood Impairment (Depressive Signs/Symptoms) (Adult)  Goal: Improved Mood Symptoms (Depressive Signs/Symptoms)  Outcome: Ongoing (interventions implemented as appropriate)      Problem: Feelings of Worthlessness, Hopelessness, Excessive Guilt (Depressive Signs/Symptoms) (Adult)  Goal: Enhanced Self-Esteem/Confidence (Depressive Signs/Symptoms)  Outcome: Ongoing (interventions implemented as appropriate)      Problem: Decreased Participation/Engagement (Depressive Signs/Symptoms) (Adult)  Goal: Increased Participation/Engagement (Depressive Signs/Symptoms)  Outcome: Ongoing (interventions implemented as appropriate)      Problem: Sleep Impairment (Depressive Signs/Symptoms) (Adult)  Goal: Improved Sleep Hygiene (Depressive Signs/Symptoms)  Outcome: Ongoing (interventions implemented as appropriate)      Problem: Patient Care Overview  Goal: Individualization and Mutuality  Outcome: Ongoing (interventions implemented as appropriate)    Goal: Discharge Needs Assessment  Outcome: Ongoing (interventions implemented as appropriate)    Goal: Interprofessional Rounds/Family Conf  Outcome: Ongoing (interventions implemented as appropriate)      Problem: Overarching Goals (Adult)  Goal: Adheres to Safety Considerations for Self and Others  Outcome: Ongoing (interventions implemented as appropriate)    Goal: Optimized Coping Skills in Response to Life Stressors  Outcome: Ongoing (interventions implemented as appropriate)    Goal: Develops/Participates in Therapeutic [place] to Support Successful Transition  Outcome: Ongoing (interventions implemented as appropriate)      Problem: Fall Risk (Adult)  Goal: Identify Related Risk Factors and Signs and Symptoms  Outcome: Ongoing (interventions implemented as appropriate)    Goal: Absence of Fall  Outcome: Ongoing (interventions implemented as appropriate)

## 2018-04-06 NOTE — NURSING NOTE
Patient encouraged throughout day not keep Lt leg down or dangling to improve circulation and decrease edema/pain but has been up all day in day room sitting/walking for short distances.

## 2018-04-07 VITALS
BODY MASS INDEX: 38.3 KG/M2 | HEIGHT: 73 IN | TEMPERATURE: 98.1 F | SYSTOLIC BLOOD PRESSURE: 150 MMHG | RESPIRATION RATE: 20 BRPM | DIASTOLIC BLOOD PRESSURE: 86 MMHG | OXYGEN SATURATION: 95 % | HEART RATE: 91 BPM | WEIGHT: 289 LBS

## 2018-04-07 PROCEDURE — 99239 HOSP IP/OBS DSCHRG MGMT >30: CPT | Performed by: PSYCHIATRY & NEUROLOGY

## 2018-04-07 RX ORDER — CLONAZEPAM 0.5 MG/1
0.5 TABLET ORAL EVERY 8 HOURS PRN
Qty: 90 TABLET | Refills: 0 | Status: SHIPPED | OUTPATIENT
Start: 2018-04-07

## 2018-04-07 RX ORDER — PANTOPRAZOLE SODIUM 40 MG/1
40 TABLET, DELAYED RELEASE ORAL DAILY
Qty: 30 TABLET | Refills: 0 | Status: SHIPPED | OUTPATIENT
Start: 2018-04-07

## 2018-04-07 RX ORDER — MIRTAZAPINE 30 MG/1
30 TABLET, FILM COATED ORAL NIGHTLY
Qty: 30 TABLET | Refills: 0 | Status: SHIPPED | OUTPATIENT
Start: 2018-04-07 | End: 2018-05-01 | Stop reason: SDUPTHER

## 2018-04-07 RX ORDER — BUPRENORPHINE AND NALOXONE 8; 2 MG/1; MG/1
2 FILM, SOLUBLE BUCCAL; SUBLINGUAL DAILY
Qty: 4 FILM | Refills: 0 | Status: SHIPPED | OUTPATIENT
Start: 2018-04-08 | End: 2018-04-15

## 2018-04-07 RX ADMIN — NICOTINE POLACRILEX 4 MG: 2 GUM, CHEWING ORAL at 07:38

## 2018-04-07 RX ADMIN — CLONAZEPAM 0.5 MG: 0.5 TABLET ORAL at 06:11

## 2018-04-07 RX ADMIN — NICOTINE POLACRILEX 4 MG: 2 GUM, CHEWING ORAL at 10:46

## 2018-04-07 RX ADMIN — GABAPENTIN 600 MG: 300 CAPSULE ORAL at 08:25

## 2018-04-07 RX ADMIN — PANTOPRAZOLE SODIUM 40 MG: 40 TABLET, DELAYED RELEASE ORAL at 06:11

## 2018-04-07 RX ADMIN — BUPRENORPHINE HYDROCHLORIDE, NALOXONE HYDROCHLORIDE 2 FILM: 8; 2 FILM, SOLUBLE BUCCAL; SUBLINGUAL at 08:25

## 2018-04-07 RX ADMIN — CLONIDINE HYDROCHLORIDE 0.2 MG: 0.2 TABLET ORAL at 08:25

## 2018-04-07 NOTE — PLAN OF CARE
Problem: Patient Care Overview  Goal: Plan of Care Review  Outcome: Ongoing (interventions implemented as appropriate)  Rated anxiety as 6 and depression as 4. Denies S.I.  Hasn't been using his crutches to ambulate. Out of room during evening hours. Up and down all night. Has slept approx. 3 hours this shift.   04/07/18 0440   Coping/Psychosocial   Plan of Care Reviewed With patient   Coping/Psychosocial   Patient Agreement with Plan of Care agrees   Plan of Care Review   Progress improving       Problem: Overarching Goals (Adult)  Goal: Adheres to Safety Considerations for Self and Others  Outcome: Ongoing (interventions implemented as appropriate)    Goal: Optimized Coping Skills in Response to Life Stressors  Outcome: Ongoing (interventions implemented as appropriate)    Goal: Develops/Participates in Therapeutic [place] to Support Successful Transition  Outcome: Ongoing (interventions implemented as appropriate)

## 2018-04-07 NOTE — PROGRESS NOTES
"INPATIENT PSYCHIATRIC PROGRESS NOTE    Name:  Avelino Watson  :  1975  MRN:  [id number]  Visit Number:  [id number]  Length of stay:  4    Behavioral Health Treatment Plan and Problem List: I have reviewed and approved the Behavioral Health Treatment Plan and Problem list.    SUBJECTIVE  CC: \"Each day is better\"     INTERVAL HISTORY:  Reviewed Dr. Zavala's notes.  Pt reports each day is better.  He reports suboxone is helpful, but wears off by about 7-8PM and foot pain starts to worsen with needle-like pain.  No SI. No ah/vh.  Pt request d/c today. I reviewed notes and safety planning with his wife.  No guns in home. Pt is also worried about getting to his appt with Dr. Lester on Monday at 9am morning in [place].    Depression rating 2/10  Anxiety rating 2-3/10  Sleep: fair, but frequent awakening  Withdrawal sx: none  Craving: denies       Review of Systems   Respiratory: Negative.    Cardiovascular: Negative.    Gastrointestinal: Negative.    Musculoskeletal: Positive for back pain.        Foot pain   Neurological: Negative.          OBJECTIVE    Temp:  [98.2 °F (36.8 °C)-98.7 °F (37.1 °C)] 98.7 °F (37.1 °C)  Heart Rate:  [88-95] 89  Resp:  [18] 18  BP: (128-148)/(78-85) 148/85    MENTAL STATUS EXAM:      Appearance:Casually dressed, good hygeine.   Cooperation:Cooperative  Psychomotor: No psychomotor agitation/retardation, No EPS, No motor tics  Speech-normal rate, amount.   Mood/Affect: Depressed  Thought Processes: associations intact  Thought Content: negativistic   Hallucination(s): none  Hopelessness: Yes  Optimistic:minimally  Suicidal Thoughts:  none  Suicidal Plan/Intent: none  Homicidal Thoughts:  absent  Orientation: oriented x 3  Memory: recent intact    Lab Results (last 24 hours)     ** No results found for the last 24 hours. **           Imaging Results (last 24 hours)     ** No results found for the last 24 hours. **           ECG/EMG Results (most recent)     Procedure Component " Value Units Date/Time    ECG 12 Lead [[id number]] Collected:  04/03/18 0259     Updated:  04/03/18 1120    Narrative:       Test Reason : Potential adverse reaction to medications.  Blood Pressure : **/** mmHG  Vent. Rate : 077 BPM     Atrial Rate : 077 BPM     P-R Int : 152 ms          QRS Dur : 088 ms      QT Int : 360 ms       P-R-T Axes : 016 -13 046 degrees     QTc Int : 407 ms    Normal sinus rhythm  Normal ECG  No previous ECGs available  Confirmed by Jose Sosa ([id number]) on 4/3/2018 11:19:59 AM    Referred By:  SHREYAS           Confirmed By:Jose Sosa           ALLERGIES: Bee venom; Indomethacin; Allopurinol; and Uloric [febuxostat]      Current Facility-Administered Medications:   •  acetaminophen (TYLENOL) tablet 500 mg, 500 mg, Oral, Q6H PRN **AND** ibuprofen (ADVIL,MOTRIN) tablet 600 mg, 600 mg, Oral, Q6H PRN, Andres Zavala MD, 600 mg at 04/06/18 1448  •  aluminum-magnesium hydroxide-simethicone (MAALOX MAX) 400-400-40 MG/5ML suspension 15 mL, 15 mL, Oral, Q6H PRN, Bettina Aguilar MD  •  benzonatate (TESSALON) capsule 100 mg, 100 mg, Oral, TID PRN, Bettina Aguilar MD  •  benztropine (COGENTIN) tablet 1 mg, 1 mg, Oral, Daily PRN, 1 mg at 04/03/18 1651 **OR** benztropine (COGENTIN) injection 0.5 mg, 0.5 mg, Intramuscular, Daily PRN, Bettina Aguilar MD  •  buprenorphine-naloxone (SUBOXONE) 8-2 MG film 2 film, 2 film, Sublingual, Daily, Andres Zavala MD, 2 film at 04/07/18 0825  •  cephalexin (KEFLEX) capsule 500 mg, 500 mg, Oral, 4x Daily, Andres Zavala MD, 500 mg at 04/06/18 2136  •  clonazePAM (KlonoPIN) tablet 0.5 mg, 0.5 mg, Oral, Q8H PRN, Andres Zavala MD, 0.5 mg at 04/07/18 0611  •  CloNIDine (CATAPRES) tablet 0.2 mg, 0.2 mg, Oral, BID, Andres Zavala MD, 0.2 mg at 04/07/18 0825  •  famotidine (PEPCID) tablet 20 mg, 20 mg, Oral, BID PRN, Bettina Aguilar MD, 20 mg at 04/03/18 1651  •  fluticasone (FLONASE) 50 MCG/ACT  nasal spray 2 spray, 2 spray, Each Nare, Nightly, Andres Zavala MD, 2 spray at 04/06/18 2136  •  gabapentin (NEURONTIN) capsule 600 mg, 600 mg, Oral, TID, Andres Zavala MD, 600 mg at 04/07/18 0825  •  loperamide (IMODIUM) capsule 2 mg, 2 mg, Oral, 4x Daily PRN, Bettina Aguilra MD, 2 mg at 04/03/18 1651  •  magnesium hydroxide (MILK OF MAGNESIA) suspension 2400 mg/10mL 10 mL, 10 mL, Oral, Daily PRN, Bettina Aguilar MD  •  mirtazapine (REMERON) tablet 30 mg, 30 mg, Oral, Nightly, Andres Zavala MD, 30 mg at 04/06/18 2136  •  nicotine polacrilex (NICORETTE) gum 4 mg, 4 mg, Mouth/Throat, Q2H PRN, Andres Zavala MD, 4 mg at 04/07/18 0738  •  ondansetron (ZOFRAN) tablet 4 mg, 4 mg, Oral, Q6H PRN, Bettina Aguilar MD, 4 mg at 04/03/18 1651  •  pantoprazole (PROTONIX) EC tablet 40 mg, 40 mg, Oral, Q AM, Andres Zavala MD, 40 mg at 04/07/18 0611  •  sodium chloride (OCEAN) nasal spray 2 spray, 2 spray, Each Nare, PRN, Bettina Aguilar MD  •  traZODone (DESYREL) tablet 150 mg, 150 mg, Oral, Nightly, Andres Zavala MD, 150 mg at 04/06/18 2137    ASSESSMENT & PLAN    Patient Active Problem List      Diagnosis Code   • Suicidal ideation R45.851 Plan: Patient is on special precautions    • Severe episode of recurrent major depressive disorder, without psychotic features F33.2 Plan: We will initiate treatment with an antidepressant Remeron plus individual and group psychotherapeutic efforts.     • Opiate dependence, continuous F11.20 Plan: At this point will utilize a combination of ibuprofen with acetaminophen for pain resuming Suboxone       • Lumbar disc disease M51.9 Plan as stated above    • Hyperlipidemia E78.5 Plan: Consider statin therapy    • Hypertension I10 Plan: For now continue the patient's clonidine.     • Chronic pain disorder G89.4 Plan: See above    • Status post left foot surgery Z98.890 Plan: Will request consultation with   Rony           Will d/c home today to care of wife.       Suicide precautions: Suicide precaution Level 3 (q15 min checks)     Behavioral Health Treatment Plan and Problem List: I have reviewed and approved the Behavioral Health Treatment Plan and Problem list.    Clinician:  Kevin Crews MD  04/07/18  9:41 AM

## 2018-04-18 NOTE — DISCHARGE SUMMARY
"      PSYCHIATRIC DISCHARGE SUMMARY     Patient Identification:  Name:  Avelino Watson  Age:  42 y.o.  Sex:  male  :  1975  MRN:  [id number]  Visit Number:  [id number]      Date of Admission:4/3/2018   Date of Discharge:  2018    Discharge Diagnosis:  Principal Problem:    Severe episode of recurrent major depressive disorder, without psychotic features  Active Problems:    Suicidal ideation    Opiate dependence, continuous    Lumbar disc disease    Hyperlipidemia    Hypertension    Chronic pain disorder    Status post left foot surgery        Admission Diagnosis:  Suicidal ideation [R45.851]     Hospital Course  Patient is a 42 y.o. male presented with worsening depression and suicidal thoughts.  The patient was by Dr. Ammon Zavala for most of the hospitalization except for day of discharge.  The patient has multiple stressors including a great deal of chronic physical pain for which she has been trying to get disability.  The patient presented as hopeless and worthless.  By the end of the hospitalization, the patient was feeling more hopeful and his pain was better controlled with Suboxone.  The patient was also started on Remeron 30 mg nightly which improved sleep.  Over the weekend, while covering the patient requested to leave.  He continues wife had completed safety planning with the therapist the previous day.  The patient was also eager to get to his Suboxone clinic appointment with Dr. Lester at 9 AM on Monday morning.  I expressed my concern about leaving and not having pain medication on  however he was not particularly worried about this.  The patient was felt stable for discharge home.  The patient was also instructed to follow-up with his PCP regarding his elevated cholesterol, A1c and triglycerides.    Mental Status Exam upon discharge:   Mood \"better\"   Affect-congruent, appropriate, stable  Thought Content-goal directed, no delusional material present  Thought process-linear, " organized.  Suicidality: No SI  Homicidality: No HI  Perception: No AH/VH    Procedures Performed         Consults:   Consults     No orders found from 3/5/2018 to 4/4/2018.          Pertinent Test Results:   Results for AMELIA TINEO (MRN [id number]) as of 4/18/2018 10:21   Ref. Range 4/3/2018 00:13 4/3/2018 00:14   Glucose Latest Ref Range: 70 - 110 mg/dL 133 (H)    Sodium Latest Ref Range: 135 - 153 mmol/L 136    Potassium Latest Ref Range: 3.5 - 5.3 mmol/L 4.0    CO2 Latest Ref Range: 24.3 - 31.9 mmol/L 25.7    Chloride Latest Ref Range: 99 - 112 mmol/L 102    Anion Gap Latest Ref Range: 3.6 - 11.2 mmol/L 8.3    Creatinine Latest Ref Range: 0.43 - 1.29 mg/dL 1.28    BUN Latest Ref Range: 7 - 21 mg/dL 12    BUN/Creatinine Ratio Latest Ref Range: 7.0 - 25.0  9.4    Calcium Latest Ref Range: 7.7 - 10.0 mg/dL 9.6    eGFR Non African Amer Latest Ref Range: >60 mL/min/1.73 62    Alkaline Phosphatase Latest Ref Range: 40 - 129 U/L 43    Total Protein Latest Ref Range: 6.0 - 8.0 g/dL 7.2    ALT (SGPT) Latest Ref Range: 10 - 44 U/L 46 (H)    AST (SGOT) Latest Ref Range: 10 - 34 U/L 28    Total Bilirubin Latest Ref Range: 0.2 - 1.8 mg/dL 0.3    Albumin Latest Ref Range: 3.50 - 5.00 g/dL 4.30    Globulin Latest Units: gm/dL 2.9    A/G Ratio Latest Ref Range: 1.5 - 2.5 g/dL 1.5    Osmolality Calc Latest Ref Range: 273.0 - 305.0 mOsm/kg 273.6    WBC Latest Ref Range: 4.50 - 12.50 10*3/mm3 12.34    RBC Latest Ref Range: 4.70 - 6.10 10*6/mm3 5.21    Hemoglobin Latest Ref Range: 14.0 - 18.0 g/dL 16.1    Hematocrit Latest Ref Range: 42.0 - 52.0 % 47.6    RDW Latest Ref Range: 11.5 - 14.5 % 12.6    MCV Latest Ref Range: 80.0 - 94.0 fL 91.4    MCH Latest Ref Range: 27.0 - 33.0 pg 30.9    MCHC Latest Ref Range: 33.0 - 37.0 g/dL 33.8    MPV Latest Ref Range: 6.0 - 10.0 fL 10.6 (H)    Platelets Latest Ref Range: 130 - 400 10*3/mm3 215    RDW-SD Latest Ref Range: 37.0 - 54.0 fl 41.5    Neutrophil % Latest Ref Range: 30.0 - 70.0 %  58.0    Lymphocyte % Latest Ref Range: 21.0 - 51.0 % 29.5    Monocyte % Latest Ref Range: 0.0 - 10.0 % 10.9 (H)    Eosinophil % Latest Ref Range: 0.0 - 5.0 % 0.8    Basophil % Latest Ref Range: 0.0 - 2.0 % 0.2    Immature Grans % Latest Ref Range: 0.0 - 0.5 % 0.6 (H)    Neutrophils, Absolute Latest Ref Range: 1.40 - 6.50 10*3/mm3 7.14 (H)    Lymphocytes, Absolute Latest Ref Range: 1.00 - 3.00 10*3/mm3 3.64 (H)    Monocytes, Absolute Latest Ref Range: 0.10 - 0.90 10*3/mm3 1.35 (H)    Eosinophils, Absolute Latest Ref Range: 0.00 - 0.70 10*3/mm3 0.10    Basophils, Absolute Latest Ref Range: 0.00 - 0.30 10*3/mm3 0.03    Immature Grans, Absolute Latest Ref Range: 0.00 - 0.03 10*3/mm3 0.08 (H)    Color, UA Latest Ref Range: Yellow, Straw   Yellow   Appearance, UA Latest Ref Range: Clear   Clear   Specific Edwards, UA Latest Ref Range: 1.005 - 1.030   1.015   pH, UA Latest Ref Range: 5.0 - 8.0   7.0   Glucose, UA Latest Ref Range: Negative   Negative   Ketones, UA Latest Ref Range: Negative   Negative   Blood, UA Latest Ref Range: Negative   Negative   Nitrite, UA Latest Ref Range: Negative   Negative   Leuk Esterase, UA Latest Ref Range: Negative   Negative   Protein, UA Latest Ref Range: Negative   Negative   Bilirubin, UA Latest Ref Range: Negative   Negative   Urobilinogen, UA Latest Ref Range: 0.2 - 1.0 E.U./dL   0.2 E.U./dL   Ethanol % Latest Units: % <0.010    Ethanol Latest Ref Range: <=10 mg/dL <10    6-ACETYL MORPHINE Latest Ref Range: Negative   Negative   Amphetamine Screen, Urine Latest Ref Range: Negative   Negative   Barbiturates Screen, Urine Latest Ref Range: Negative   Negative   Benzodiazepine Screen, Urine Latest Ref Range: Negative   Negative   Buprenorphine, Screen, Urine Latest Ref Range: Negative   Negative   Cocaine Screen, Urine Latest Ref Range: Negative   Negative   Methadone Screen , Urine Latest Ref Range: Negative   Negative   Opiate Screen, Urine Latest Ref Range: Negative   Negative    Oxycodone Screen, Urine Latest Ref Range: Negative   Positive (A)   Phencyclidine (PCP), Urine Latest Ref Range: Negative   Negative   THC Screen, Urine Latest Ref Range: Negative   Negative     Results for AMELIA TINEO (MRN [id number]) as of 4/18/2018 10:21   Ref. Range 4/5/2018 05:13   Hemoglobin A1C Latest Ref Range: 4.50 - 5.70 % 6.60 (H)   Total Cholesterol Latest Ref Range: 0 - 200 mg/dL 206 (H)   HDL Cholesterol Latest Ref Range: 60 - 100 mg/dL 32 (L)   LDL Cholesterol  Latest Ref Range: 0 - 100 mg/dL See Comment   VLDL Cholesterol Latest Units: mg/dL See Comment   Triglycerides Latest Ref Range: 0 - 150 mg/dL 406 (H)   LDL/HDL Ratio Unknown See Comment     Condition on Discharge:  stable    Vital Signs         Discharge Disposition:  Home or Self Care    Discharge Medications:   Amelia Tineo   Home Medication Instructions MAXIMO:[id number]    Printed on:04/18/18 1013   Medication Information                      clonazePAM (KlonoPIN) 0.5 MG tablet  Take 1 tablet by mouth Every 8 (Eight) Hours As Needed for Seizures.             CloNIDine (CATAPRES) 0.2 MG tablet  Take 0.2 mg by mouth 2 (Two) Times a Day.             fenofibrate micronized (LOFIBRA) 134 MG capsule  Take 134 mg by mouth Every Morning Before Breakfast.             fluticasone (FLONASE) 50 MCG/ACT nasal spray  2 sprays into each nostril Daily.             gabapentin (NEURONTIN) 600 MG tablet  Take 600 mg by mouth 3 (Three) Times a Day.             mirtazapine (REMERON) 30 MG tablet  Take 1 tablet by mouth Every Night.             pantoprazole (PROTONIX) 40 MG EC tablet  Take 1 tablet by mouth Daily.             Testosterone Cypionate (DEPOTESTOTERONE CYPIONATE) 200 MG/ML injection  Inject 200 mg into the shoulder, thigh, or buttocks Every 28 (Twenty-Eight) Days.                 Discharge Diet:   Diet Instructions     As tolerated                  Activity at Discharge:   Activity Instructions     As tolerated                  Follow-up  Appointments  Future Appointments  Date Time Provider Department Center   4/30/2018 9:00 AM Deanna Yeager LCSW [facility] None   5/1/2018 9:00 AM KACY Zamora [facility] None         Test Results Pending at Discharge      Clinician:   Kevin Crews MD  04/18/18  10:13 AM

## 2018-05-01 ENCOUNTER — OFFICE VISIT (OUTPATIENT)
Dept: PSYCHIATRY | Facility: CLINIC | Age: 43
End: 2018-05-01

## 2018-05-01 VITALS
HEIGHT: 73 IN | HEART RATE: 90 BPM | DIASTOLIC BLOOD PRESSURE: 109 MMHG | SYSTOLIC BLOOD PRESSURE: 178 MMHG | WEIGHT: 295 LBS | BODY MASS INDEX: 39.1 KG/M2

## 2018-05-01 DIAGNOSIS — Z79.899 MEDICATION MANAGEMENT: Primary | ICD-10-CM

## 2018-05-01 DIAGNOSIS — F33.1 MAJOR DEPRESSIVE DISORDER, RECURRENT EPISODE, MODERATE (HCC): ICD-10-CM

## 2018-05-01 DIAGNOSIS — F11.20 OPIOID DEPENDENCE ON AGONIST THERAPY (HCC): ICD-10-CM

## 2018-05-01 LAB
AMPHETAMINE CUT-OFF: 1000
BENZODIAZIPINE CUT-OFF: 300
BUPRENORPHINE CUT-OFF: 10
COCAINE CUT-OFF: 300
EXTERNAL AMPHETAMINE SCREEN URINE: NEGATIVE
EXTERNAL BENZODIAZEPINE SCREEN URINE: NEGATIVE
EXTERNAL BUPRENORPHINE SCREEN URINE: POSITIVE
EXTERNAL COCAINE SCREEN URINE: NEGATIVE
EXTERNAL MDMA: NEGATIVE
EXTERNAL METHADONE SCREEN URINE: NEGATIVE
EXTERNAL METHAMPHETAMINE SCREEN URINE: NEGATIVE
EXTERNAL OPIATES SCREEN URINE: POSITIVE
EXTERNAL OXYCODONE SCREEN URINE: NEGATIVE
EXTERNAL THC SCREEN URINE: NEGATIVE
MDMA CUT-OFF: 500
METHADONE CUT-OFF: 300
METHAMPHETAMINE CUT-OFF: 1000
OPIATES CUT-OFF: 300
OXYCODONE CUT-OFF: 100
THC CUT-OFF: 50

## 2018-05-01 PROCEDURE — 99214 OFFICE O/P EST MOD 30 MIN: CPT | Performed by: NURSE PRACTITIONER

## 2018-05-01 RX ORDER — POTASSIUM CHLORIDE 750 MG/1
10 CAPSULE, EXTENDED RELEASE ORAL AS NEEDED
Refills: 1 | COMMUNITY
Start: 2018-04-10

## 2018-05-01 RX ORDER — MIRTAZAPINE 45 MG/1
45 TABLET, FILM COATED ORAL NIGHTLY
Qty: 30 TABLET | Refills: 0 | Status: SHIPPED | OUTPATIENT
Start: 2018-05-01

## 2018-05-01 RX ORDER — FUROSEMIDE 20 MG/1
20 TABLET ORAL AS NEEDED
Refills: 1 | COMMUNITY
Start: 2018-04-10

## 2018-05-01 NOTE — PROGRESS NOTES
Subjective   Avelino Watson is a 43 y.o. male is here today for medication management follow-up at the [facility] after being discharged from the [facility], he presents to his appointment on time.    Chief Complaint: Discharge from hospital      History of Present Illness  Patient states that he ws admitted to the [facility] because he became suicidal after he was turned down again by social security.  He states that he hasn't worked for a long time and his financial situation is severe.  While hospitalized, he was started on remeron.  He shares that he has not had any SE from the medications.  He is also on suboxone; was taking klonopin but made aware that the combination could not be provided by this clinic.  He shares that he rates his depression 6/10 with 10 being the worse- reports sadness, no energy or motivation with problems completing his ADLs, also has feelings of being worthless, useless, and hopeless.  He rates his anxiety 7/10 with 10 beng the worse, reports feeling worried, on edge, jumpy, overwhelmed, with a history of panic attacks.  He is getting about 5-6 hours of sleep per night with no NM.  Appetite is good with no recent weight changes.  He is stressed because of fiances and his future.  He recently was treated for double pneumonia with antibiotics.  Denies any AV hallucinations, denies any SI/HI.  He recently required opiate treatment because of foot surgery to remove a cyst that developed from gout, he is currently taking suboxone for pain management. He is noted to have elevated BP but denies any chest pain or distress.  Recommended that he followup with his PCP, patient acknowledged.  Reji reviewed.  UDS- +BUP, +OPI.     The following portions of the patient's history were reviewed and updated as appropriate: allergies, current medications, past family history, past medical history, past social history, past surgical history and problem list.    Review of Systems  "  Constitutional: Negative for appetite change, chills, diaphoresis, fatigue, fever and unexpected weight change.   HENT: Negative for hearing loss, sore throat, trouble swallowing and voice change.    Eyes: Negative for photophobia and visual disturbance.   Respiratory: Positive for cough. Negative for chest tightness and shortness of breath.    Cardiovascular: Negative for chest pain and palpitations.   Gastrointestinal: Negative for abdominal pain, constipation, nausea and vomiting.   Endocrine: Negative for cold intolerance and heat intolerance.   Genitourinary: Negative for dysuria and frequency.   Musculoskeletal: Negative for arthralgias, back pain, joint swelling and neck stiffness.   Skin: Negative for color change and wound.   Allergic/Immunologic: Negative for environmental allergies and immunocompromised state.   Neurological: Negative for dizziness, tremors, seizures, syncope, weakness, light-headedness and headaches.   Hematological: Negative for adenopathy. Does not bruise/bleed easily.       Objective   Physical Exam   Constitutional: He appears well-developed and well-nourished. No distress.   Neurological: He is alert. Coordination and gait normal.   Vitals reviewed.    Blood pressure (!) 178/109, pulse 90, height 185.4 cm (73\"), weight 134 kg (295 lb).    Medication List:   Current Outpatient Prescriptions   Medication Sig Dispense Refill   • clonazePAM (KlonoPIN) 0.5 MG tablet Take 1 tablet by mouth Every 8 (Eight) Hours As Needed for Seizures. 90 tablet 0   • fluticasone (FLONASE) 50 MCG/ACT nasal spray 2 sprays into each nostril Daily.     • gabapentin (NEURONTIN) 600 MG tablet Take 600 mg by mouth 3 (Three) Times a Day.     • mirtazapine (REMERON) 45 MG tablet Take 1 tablet by mouth Every Night. 30 tablet 0   • pantoprazole (PROTONIX) 40 MG EC tablet Take 1 tablet by mouth Daily. 30 tablet 0   • Testosterone Cypionate (DEPOTESTOTERONE CYPIONATE) 200 MG/ML injection Inject 200 mg into the " shoulder, thigh, or buttocks Every 28 (Twenty-Eight) Days.     • CloNIDine (CATAPRES) 0.2 MG tablet Take 0.2 mg by mouth 2 (Two) Times a Day.     • fenofibrate micronized (LOFIBRA) 134 MG capsule Take 134 mg by mouth Every Morning Before Breakfast.     • furosemide (LASIX) 20 MG tablet   1   • potassium chloride (MICRO-K) 10 MEQ CR capsule   1     No current facility-administered medications for this visit.        Mental Status Exam:   Hygiene:   fair  Cooperation:  Guarded  Eye Contact:  Fair  Psychomotor Behavior:  Appropriate  Affect:  Blunted  Hopelessness: Denies  Speech:  Normal  Thought Process:  Goal directed and Linear  Thought Content:  Mood congurent  Suicidal:  None  Homicidal:  None  Hallucinations:  None  Delusion:  None  Memory:  Intact  Orientation:  Person, Place, Time and Situation  Reliability:  fair  Insight:  Fair  Judgement:  Fair  Impulse Control:  Fair  Physical/Medical Issues:  No     Assessment/Plan   Problems Addressed this Visit     None      Visit Diagnoses     Medication management    -  Primary    Relevant Orders    [facility] Drug Screen (Completed)    Major depressive disorder, recurrent episode, moderate        Relevant Medications    mirtazapine (REMERON) 45 MG tablet    Opioid dependence on agonist therapy            Discussed medication options.  Continue and increase the remeron for depression, anxiety, and sleep.  Reviewed the risks, benefits, and side effects of the medications; patient acknowledged and verbally consented.  Patient is agreeable to call the [facility].  Patient is aware to call 911 or go to the nearest ER should begin having SI/HI.     Return in 4 weeks

## 2018-05-15 ENCOUNTER — DOCUMENTATION (OUTPATIENT)
Dept: PSYCHIATRY | Facility: CLINIC | Age: 43
End: 2018-05-15

## 2018-05-15 RX ORDER — CLONAZEPAM 0.5 MG/1
0.5 TABLET ORAL EVERY 8 HOURS PRN
Qty: 90 TABLET | Refills: 0 | OUTPATIENT
Start: 2018-05-15

## 2018-05-15 NOTE — PROGRESS NOTES
Reviewed urine toxicology.  Patient was noted to be positve for clonazepam, buprenorphine, hydrocodone, gabapentin, and meperidine.  Please urine toxicology in media.

## 2018-05-27 ENCOUNTER — APPOINTMENT (OUTPATIENT)
Dept: CT IMAGING | Facility: HOSPITAL | Age: 43
End: 2018-05-27

## 2018-05-27 ENCOUNTER — HOSPITAL ENCOUNTER (EMERGENCY)
Facility: HOSPITAL | Age: 43
Discharge: HOME | End: 2018-05-27
Attending: EMERGENCY MEDICINE | Admitting: NURSE PRACTITIONER

## 2018-05-27 VITALS
WEIGHT: 290 LBS | SYSTOLIC BLOOD PRESSURE: 151 MMHG | HEIGHT: 72 IN | DIASTOLIC BLOOD PRESSURE: 90 MMHG | OXYGEN SATURATION: 98 % | BODY MASS INDEX: 39.28 KG/M2 | RESPIRATION RATE: 17 BRPM | TEMPERATURE: 98.2 F | HEART RATE: 84 BPM

## 2018-05-27 DIAGNOSIS — R10.84 GENERALIZED ABDOMINAL PAIN: Primary | ICD-10-CM

## 2018-05-27 LAB
6-ACETYL MORPHINE: NEGATIVE
ALBUMIN SERPL-MCNC: 4.3 G/DL (ref 3.5–5)
ALBUMIN/GLOB SERPL: 1.7 G/DL (ref 1.5–2.5)
ALP SERPL-CCNC: 46 U/L (ref 40–129)
ALT SERPL W P-5'-P-CCNC: 40 U/L (ref 10–44)
AMPHET+METHAMPHET UR QL: NEGATIVE
AMYLASE SERPL-CCNC: 24 U/L (ref 28–100)
ANION GAP SERPL CALCULATED.3IONS-SCNC: 6.2 MMOL/L (ref 3.6–11.2)
AST SERPL-CCNC: 27 U/L (ref 10–34)
BARBITURATES UR QL SCN: NEGATIVE
BASOPHILS # BLD AUTO: 0.02 10*3/MM3 (ref 0–0.3)
BASOPHILS NFR BLD AUTO: 0.2 % (ref 0–2)
BENZODIAZ UR QL SCN: NEGATIVE
BILIRUB SERPL-MCNC: 0.5 MG/DL (ref 0.2–1.8)
BILIRUB UR QL STRIP: NEGATIVE
BUN BLD-MCNC: 7 MG/DL (ref 7–21)
BUN/CREAT SERPL: 7.5 (ref 7–25)
BUPRENORPHINE SERPL-MCNC: POSITIVE NG/ML
CALCIUM SPEC-SCNC: 9 MG/DL (ref 7.7–10)
CANNABINOIDS SERPL QL: NEGATIVE
CHLORIDE SERPL-SCNC: 101 MMOL/L (ref 99–112)
CLARITY UR: CLEAR
CO2 SERPL-SCNC: 30.8 MMOL/L (ref 24.3–31.9)
COCAINE UR QL: NEGATIVE
COLOR UR: YELLOW
CREAT BLD-MCNC: 0.93 MG/DL (ref 0.43–1.29)
DEPRECATED RDW RBC AUTO: 42.3 FL (ref 37–54)
EOSINOPHIL # BLD AUTO: 0.1 10*3/MM3 (ref 0–0.7)
EOSINOPHIL NFR BLD AUTO: 1.2 % (ref 0–5)
ERYTHROCYTE [DISTWIDTH] IN BLOOD BY AUTOMATED COUNT: 12.9 % (ref 11.5–14.5)
GFR SERPL CREATININE-BSD FRML MDRD: 90 ML/MIN/1.73
GLOBULIN UR ELPH-MCNC: 2.6 GM/DL
GLUCOSE BLD-MCNC: 150 MG/DL (ref 70–110)
GLUCOSE UR STRIP-MCNC: NEGATIVE MG/DL
HCT VFR BLD AUTO: 46.5 % (ref 42–52)
HGB BLD-MCNC: 15.7 G/DL (ref 14–18)
HGB UR QL STRIP.AUTO: NEGATIVE
IMM GRANULOCYTES # BLD: 0.04 10*3/MM3 (ref 0–0.03)
IMM GRANULOCYTES NFR BLD: 0.5 % (ref 0–0.5)
KETONES UR QL STRIP: NEGATIVE
LEUKOCYTE ESTERASE UR QL STRIP.AUTO: NEGATIVE
LIPASE SERPL-CCNC: 25 U/L (ref 13–60)
LYMPHOCYTES # BLD AUTO: 2.3 10*3/MM3 (ref 1–3)
LYMPHOCYTES NFR BLD AUTO: 28.5 % (ref 21–51)
MCH RBC QN AUTO: 30.4 PG (ref 27–33)
MCHC RBC AUTO-ENTMCNC: 33.8 G/DL (ref 33–37)
MCV RBC AUTO: 90.1 FL (ref 80–94)
METHADONE UR QL SCN: NEGATIVE
MONOCYTES # BLD AUTO: 0.95 10*3/MM3 (ref 0.1–0.9)
MONOCYTES NFR BLD AUTO: 11.8 % (ref 0–10)
NEUTROPHILS # BLD AUTO: 4.66 10*3/MM3 (ref 1.4–6.5)
NEUTROPHILS NFR BLD AUTO: 57.8 % (ref 30–70)
NITRITE UR QL STRIP: NEGATIVE
OPIATES UR QL: NEGATIVE
OSMOLALITY SERPL CALC.SUM OF ELEC: 276.5 MOSM/KG (ref 273–305)
OXYCODONE UR QL SCN: NEGATIVE
PCP UR QL SCN: NEGATIVE
PH UR STRIP.AUTO: 8 [PH] (ref 5–8)
PLATELET # BLD AUTO: 194 10*3/MM3 (ref 130–400)
PMV BLD AUTO: 10.7 FL (ref 6–10)
POTASSIUM BLD-SCNC: 4 MMOL/L (ref 3.5–5.3)
PROT SERPL-MCNC: 6.9 G/DL (ref 6–8)
PROT UR QL STRIP: NEGATIVE
RBC # BLD AUTO: 5.16 10*6/MM3 (ref 4.7–6.1)
SODIUM BLD-SCNC: 138 MMOL/L (ref 135–153)
SP GR UR STRIP: 1.01 (ref 1–1.03)
UROBILINOGEN UR QL STRIP: NORMAL
WBC NRBC COR # BLD: 8.07 10*3/MM3 (ref 4.5–12.5)

## 2018-05-27 PROCEDURE — 80307 DRUG TEST PRSMV CHEM ANLYZR: CPT | Performed by: NURSE PRACTITIONER

## 2018-05-27 PROCEDURE — 25010000002 IOPAMIDOL 61 % SOLUTION: Performed by: EMERGENCY MEDICINE

## 2018-05-27 PROCEDURE — 83690 ASSAY OF LIPASE: CPT | Performed by: NURSE PRACTITIONER

## 2018-05-27 PROCEDURE — 81003 URINALYSIS AUTO W/O SCOPE: CPT | Performed by: NURSE PRACTITIONER

## 2018-05-27 PROCEDURE — 74177 CT ABD & PELVIS W/CONTRAST: CPT | Performed by: RADIOLOGY

## 2018-05-27 PROCEDURE — 96360 HYDRATION IV INFUSION INIT: CPT

## 2018-05-27 PROCEDURE — 99283 EMERGENCY DEPT VISIT LOW MDM: CPT

## 2018-05-27 PROCEDURE — 85025 COMPLETE CBC W/AUTO DIFF WBC: CPT | Performed by: NURSE PRACTITIONER

## 2018-05-27 PROCEDURE — 74177 CT ABD & PELVIS W/CONTRAST: CPT

## 2018-05-27 PROCEDURE — 80053 COMPREHEN METABOLIC PANEL: CPT | Performed by: NURSE PRACTITIONER

## 2018-05-27 PROCEDURE — 82150 ASSAY OF AMYLASE: CPT | Performed by: NURSE PRACTITIONER

## 2018-05-27 RX ORDER — SODIUM CHLORIDE 0.9 % (FLUSH) 0.9 %
10 SYRINGE (ML) INJECTION AS NEEDED
Status: DISCONTINUED | OUTPATIENT
Start: 2018-05-27 | End: 2018-05-27 | Stop reason: HOSPADM

## 2018-05-27 RX ADMIN — SODIUM CHLORIDE 1000 ML: 9 INJECTION, SOLUTION INTRAVENOUS at 19:00

## 2018-05-27 RX ADMIN — IOPAMIDOL 90 ML: 612 INJECTION, SOLUTION INTRAVENOUS at 19:47

## 2018-05-29 ENCOUNTER — TELEPHONE (OUTPATIENT)
Dept: EMERGENCY DEPT | Facility: HOSPITAL | Age: 43
End: 2018-05-29

## 2018-07-10 ENCOUNTER — HOSPITAL ENCOUNTER (EMERGENCY)
Facility: HOSPITAL | Age: 43
Discharge: HOME | End: 2018-07-10
Attending: EMERGENCY MEDICINE | Admitting: EMERGENCY MEDICINE

## 2018-07-10 VITALS
DIASTOLIC BLOOD PRESSURE: 98 MMHG | WEIGHT: 300 LBS | OXYGEN SATURATION: 99 % | HEART RATE: 90 BPM | HEIGHT: 70 IN | SYSTOLIC BLOOD PRESSURE: 146 MMHG | TEMPERATURE: 98.4 F | RESPIRATION RATE: 18 BRPM | BODY MASS INDEX: 42.95 KG/M2

## 2018-07-10 DIAGNOSIS — F41.9 ANXIETY DISORDER, UNSPECIFIED TYPE: Primary | ICD-10-CM

## 2018-07-10 PROCEDURE — 99283 EMERGENCY DEPT VISIT LOW MDM: CPT

## 2018-07-10 RX ORDER — CLONAZEPAM 1 MG/1
1 TABLET ORAL 2 TIMES DAILY PRN
Qty: 8 TABLET | Refills: 0 | Status: SHIPPED | OUTPATIENT
Start: 2018-07-10 | End: 2018-07-10

## 2018-07-10 NOTE — ED NOTES
Pt reports that he has been having panic attacks and has been out of his prescribed Klonopin that he has taken for 20 + years. Pt appears calm and is cooperative. JEFFERY Mena RN  07/10/18 1964

## 2018-07-10 NOTE — ED PROVIDER NOTES
Subjective     History provided by:  Patient   used: No    Anxiety   Presents for initial visit. Onset was in the past 7 days. The problem has been rapidly worsening. Symptoms include chest pain, excessive worry, feeling of choking, hyperventilation and nervous/anxious behavior. Symptoms occur most days. The most recent episode lasted 1 day. The severity of symptoms is severe. Nothing aggravates the symptoms. The patient sleeps 5 hours per night. The quality of sleep is poor. Nighttime awakenings: none.     There are no known risk factors. There is no history of anemia, anxiety/panic attacks, arrhythmia, CAD or CHF. Past treatments include nothing. The treatment provided no relief. Compliance with prior treatments has been good.       Review of Systems   Cardiovascular: Positive for chest pain.   Psychiatric/Behavioral: The patient is nervous/anxious.    All other systems reviewed and are negative.      Past Medical History:   Diagnosis Date   • Anxiety    • Arthritis    • Asthma    • Chronic pain disorder    • Depression    • Hyperlipidemia    • Hypertension    • Injury of back    • Kidney stone    • Migraine    • Pancreatitis    • Sciatica    • Spinal stenosis        Allergies   Allergen Reactions   • Bee Venom Anaphylaxis   • Indomethacin Hives   • Allopurinol Rash   • Uloric [Febuxostat] Rash       Past Surgical History:   Procedure Laterality Date   • COSMETIC SURGERY     • DENTAL PROCEDURE     • FRACTURE SURGERY      left arm humerous bone    • KNEE SURGERY         Family History   Problem Relation Age of Onset   • No Known Problems Mother    • Suicide Attempts Father        Social History     Social History   • Marital status: Single     Social History Main Topics   • Smoking status: Never Smoker   • Smokeless tobacco: Current User     Types: Snuff   • Alcohol use No      Comment: Drinks about 2 beers a month   • Drug use: No   • Sexual activity: Yes     Partners: Female     Other Topics  Concern   • Not on file           Objective   Physical Exam   Constitutional: He is oriented to person, place, and time. He appears well-developed and well-nourished.   HENT:   Head: Normocephalic and atraumatic.   Right Ear: External ear normal.   Left Ear: External ear normal.   Nose: Nose normal.   Mouth/Throat: Oropharynx is clear and moist.   Eyes: Conjunctivae and EOM are normal. Pupils are equal, round, and reactive to light.   Neck: Normal range of motion. Neck supple.   Cardiovascular: Normal rate, regular rhythm, normal heart sounds and intact distal pulses.    No murmur heard.  Pulmonary/Chest: Effort normal and breath sounds normal.   Abdominal: Soft. Bowel sounds are normal. He exhibits no distension and no mass. There is no tenderness. There is no rebound and no guarding.   Musculoskeletal: He exhibits no edema, tenderness or deformity.        Lumbar back: He exhibits decreased range of motion, swelling, pain and spasm. He exhibits no deformity.   Neurological: He is alert and oriented to person, place, and time. He has normal reflexes. He displays normal reflexes. No cranial nerve deficit. He exhibits normal muscle tone. Coordination normal.   Skin: Skin is warm and dry. Capillary refill takes less than 2 seconds. No rash noted. No erythema. No pallor.   Psychiatric: Judgment and thought content normal. His mood appears anxious. His speech is not rapid and/or pressured and not slurred. He is agitated and aggressive.   Nursing note and vitals reviewed.      Procedures           ED Course  ED Course as of Jul 10 2140   Tue Jul 10, 2018   [address] [facility] login [id number]  []      ED Course User Index  [] Hay Harley PA-C                  [facility]      Final diagnoses:   Anxiety disorder, unspecified type            Hay Harley PA-C  07/10/18 2140

## 2018-07-23 ENCOUNTER — CONSULT (OUTPATIENT)
Dept: GASTROENTEROLOGY | Facility: CLINIC | Age: 43
End: 2018-07-23

## 2018-07-23 VITALS
HEIGHT: 72 IN | SYSTOLIC BLOOD PRESSURE: 138 MMHG | OXYGEN SATURATION: 94 % | BODY MASS INDEX: 41.83 KG/M2 | DIASTOLIC BLOOD PRESSURE: 93 MMHG | HEART RATE: 80 BPM | WEIGHT: 308.8 LBS

## 2018-07-23 DIAGNOSIS — K62.5 RECTAL BLEEDING: Primary | ICD-10-CM

## 2018-07-23 DIAGNOSIS — Z87.19 HISTORY OF COLITIS: ICD-10-CM

## 2018-07-23 DIAGNOSIS — Z80.0 FAMILY HISTORY OF COLON CANCER: ICD-10-CM

## 2018-07-23 PROCEDURE — 99244 OFF/OP CNSLTJ NEW/EST MOD 40: CPT | Performed by: PHYSICIAN ASSISTANT

## 2018-07-23 NOTE — PROGRESS NOTES
Chief Complaint   Patient presents with   • colitis   • strong family history of colon cancer     Avelino Watson is a 43 y.o. male who presents to the office today as a new patient for evaluation of colitis and strong family history of colon cancer.    HPI   Reports rectal bleeding for the past 2 weeks but has not been present with each stool. The blood changes the color of the toilet water and tissue is saturated after a BM. He thinks he does have hemorrhoids but is not sure. No rectal pain. He noticed some lower abdominal pain over the past few months. He noticed improvement in this pain after completing 10 day therapy of antibiotics (Cipro and Flagyl) given at the ED. He was evaluated at [facility] ED 5/27/2018 and had CT which showed colitis. He also reports that he has been taking pain medications for chronic back pain related to herniated discs and has been through several different medications over the past few years. He feels that this has changed his digestion. If he is off pain medications, he has more frequent and loose stools. He takes Protonix 40 mg once daily and states that this medication has helped with his stools and he has 1 BM per day currently.     Maternal grandmother had colon cancer and mother was diagnosed with colon cancer which was advanced at age 53.     Labs 5/27/2018:  Hemoglobin 14.0 - 18.0 g/dL 15.7    Hematocrit 42.0 - 52.0 % 46.5    MCV 80.0 - 94.0 fL 90.1      CT abd/pelv 5/27/2018:  FINDINGS:   The lung bases are clear. There are no pleural effusions.     The liver is homogeneous, but decreased in attenuation suggestive of  fatty infiltration of the liver.     The spleen is homogeneous.     There is no peripancreatic stranding or pancreatic head mass.     There is no adrenal enlargement.     The kidneys show no evidence of hydronephrosis or hydroureter. I do not  see any distal ureteral stones.      Otherwise I do not see any free fluid or walled off fluid collections.     There  is thickening of the ascending and transverse colon wall.     IMPRESSION:  Appearance suggestive of ascending and transverse colitis.    Review of Systems   Constitutional: Negative for chills, fatigue and fever.   HENT: Negative for congestion, sore throat and trouble swallowing.    Eyes: Negative.    Respiratory: Negative.    Cardiovascular: Positive for leg swelling. Negative for chest pain and palpitations.   Gastrointestinal: Positive for abdominal distention, abdominal pain, anal bleeding and blood in stool. Negative for constipation, diarrhea, nausea, rectal pain and vomiting.   Endocrine: Negative for cold intolerance and heat intolerance.   Genitourinary: Negative.    Musculoskeletal: Positive for arthralgias, back pain and myalgias.   Skin: Negative.    Allergic/Immunologic: Negative for environmental allergies and food allergies.   Neurological: Negative for dizziness and light-headedness.   Hematological: Does not bruise/bleed easily.   Psychiatric/Behavioral: Negative for sleep disturbance. The patient is nervous/anxious.      Past Medical History:   Diagnosis Date   • Anxiety    • Arthritis    • Asthma    • Chronic pain disorder    • Depression    • Hyperlipidemia    • Hypertension    • Injury of back    • Kidney stone    • Migraine    • Pancreatitis    • Sciatica    • Spinal stenosis      Past Surgical History:   Procedure Laterality Date   • COSMETIC SURGERY     • DENTAL PROCEDURE     • FRACTURE SURGERY      left arm humerous bone    • KNEE SURGERY       Family History   Problem Relation Age of Onset   • Colon cancer Mother    • Suicide Attempts Father    • Colon cancer Maternal Grandmother      Social History   Substance Use Topics   • Smoking status: Never Smoker   • Smokeless tobacco: Current User     Types: Snuff   • Alcohol use No      Comment: Drinks about 2 beers a month       CURRENT MEDICATION:  •  clonazePAM (KlonoPIN) 0.5 MG tablet, Take 1 tablet by mouth Every 8 (Eight) Hours As Needed  "for Seizures., Disp: 90 tablet, Rfl: 0  •  CloNIDine (CATAPRES) 0.2 MG tablet, Take 0.2 mg by mouth As Needed., Disp: , Rfl:   •  fenofibrate micronized (LOFIBRA) 134 MG capsule, Take 134 mg by mouth Every Morning Before Breakfast., Disp: , Rfl:   •  fluticasone (FLONASE) 50 MCG/ACT nasal spray, 2 sprays into each nostril Daily., Disp: , Rfl:   •  furosemide (LASIX) 20 MG tablet, Take 20 mg by mouth As Needed., Disp: , Rfl: 1  •  gabapentin (NEURONTIN) 600 MG tablet, Take 600 mg by mouth 3 (Three) Times a Day., Disp: , Rfl:   •  mirtazapine (REMERON) 45 MG tablet, Take 1 tablet by mouth Every Night., Disp: 30 tablet, Rfl: 0  •  pantoprazole (PROTONIX) 40 MG EC tablet, Take 1 tablet by mouth Daily., Disp: 30 tablet, Rfl: 0  •  potassium chloride (MICRO-K) 10 MEQ CR capsule, Take 10 mEq by mouth As Needed., Disp: , Rfl: 1  •  Testosterone Cypionate (DEPOTESTOTERONE CYPIONATE) 200 MG/ML injection, Inject 200 mg into the shoulder, thigh, or buttocks Every 28 (Twenty-Eight) Days., Disp: , Rfl:     ALLERGIES:  Bee venom; Indomethacin; Allopurinol; and Uloric [febuxostat]    VISIT VITALS:  /93   Pulse 80   Ht 182.9 cm (72\")   Wt (!) 140 kg (308 lb 12.8 oz)   SpO2 94%   BMI 41.88 kg/m²     Physical Exam   Constitutional: He is oriented to person, place, and time. He appears well-developed and well-nourished. No distress.   obese   HENT:   Head: Normocephalic and atraumatic.   Nose: Nose normal.   Mouth/Throat: Oropharynx is clear and moist.   Hoarse voice   Eyes: Conjunctivae are normal. Right eye exhibits no discharge. Left eye exhibits no discharge. No scleral icterus.   Neck: Normal range of motion. No JVD present.   Cardiovascular: Normal rate, regular rhythm and normal heart sounds.  Exam reveals no gallop and no friction rub.    No murmur heard.  Pulmonary/Chest: Effort normal and breath sounds normal. No respiratory distress. He has no wheezes. He has no rales. He exhibits no tenderness.   Abdominal: Soft. " Bowel sounds are normal. He exhibits no mass. There is tenderness (mild, generalized).   Musculoskeletal: Normal range of motion. He exhibits no edema or deformity.   Neurological: He is alert and oriented to person, place, and time. Coordination normal.   Skin: Skin is warm and dry. No rash noted. He is not diaphoretic. There is erythema (mild).   tattoos   Psychiatric: He has a normal mood and affect. His behavior is normal. Judgment and thought content normal.   Slow speech   Vitals reviewed.      Assessment/Plan     1. Rectal bleeding    2. Family history of colon cancer    3. History of colitis      Orders Placed This Encounter   Procedures   • Follow Anesthesia Guidelines / Standing Orders     COLONOSCOPY CPT CODE: 50977 (N/A)  He will need a colonoscopy performed with IV general sedation. All of the risks, benefits and alternatives of this procedure have been discussed with him, all of his questions have been answered and he has elected to proceed. He should follow up in the office after this procedure to discuss the results and further recommendations can be made at that time.    New Medications Ordered This Visit   Medications   • polyethylene glycol (GoLYTELY) 236 g solution     Sig: Starting at 6pm the day before procedure, drink 8 ounces every 30 minutes until all gone or stools are clear. May add flavor packet.     Dispense:  4000 mL     Refill:  0     We discussed differential diagnoses of his current complaints. He is worried about having colon cancer and would like his procedure to be performed as soon as possible. He also states that he would be interested in hemorrhoidal banding procedure during colonoscopy if he has internal hemorrhoids that need to be removed.     Patient's Body mass index is 41.88 kg/m². BMI is above normal parameters. Recommendations include: educational material.          Return for follow up after procedure to discuss results.      Electronically signed 7/23/2018 2:02  KIERA Lindsay PA-C, [facility]

## 2018-07-31 ENCOUNTER — TELEPHONE (OUTPATIENT)
Dept: SURGERY | Facility: CLINIC | Age: 43
End: 2018-07-31

## 2018-07-31 PROBLEM — K62.5 RECTAL BLEEDING: Status: ACTIVE | Noted: 2018-07-31

## 2018-07-31 PROBLEM — Z80.0 FAMILY HISTORY OF COLON CANCER: Status: ACTIVE | Noted: 2018-07-31

## 2018-07-31 PROBLEM — Z87.19 HISTORY OF COLITIS: Status: ACTIVE | Noted: 2018-07-31

## 2018-07-31 NOTE — TELEPHONE ENCOUNTER
I spoke with the patient this morning via telephone. I informed him of his scope scheduled for 8/15/18 with arrival of 8am he states he will be present. Clear liquid diet and bowel prep instructions discussed with the patient. I also mailed him instructions and a pamphlet with 's information and our office contact information.    BC 7/31/18 @ 9:27am

## 2018-08-15 ENCOUNTER — ANESTHESIA EVENT (OUTPATIENT)
Dept: PERIOP | Facility: HOSPITAL | Age: 43
End: 2018-08-15

## 2018-08-15 ENCOUNTER — HOSPITAL ENCOUNTER (OUTPATIENT)
Facility: HOSPITAL | Age: 43
Setting detail: HOSPITAL OUTPATIENT SURGERY
Discharge: HOME | End: 2018-08-15
Attending: SURGERY | Admitting: SURGERY

## 2018-08-15 ENCOUNTER — ANESTHESIA (OUTPATIENT)
Dept: PERIOP | Facility: HOSPITAL | Age: 43
End: 2018-08-15

## 2018-08-15 VITALS
TEMPERATURE: 97.5 F | HEART RATE: 76 BPM | RESPIRATION RATE: 18 BRPM | HEIGHT: 72 IN | SYSTOLIC BLOOD PRESSURE: 139 MMHG | BODY MASS INDEX: 41.31 KG/M2 | DIASTOLIC BLOOD PRESSURE: 88 MMHG | WEIGHT: 305 LBS | OXYGEN SATURATION: 95 %

## 2018-08-15 DIAGNOSIS — K62.5 RECTAL BLEEDING: ICD-10-CM

## 2018-08-15 DIAGNOSIS — Z80.0 FAMILY HISTORY OF COLON CANCER: ICD-10-CM

## 2018-08-15 DIAGNOSIS — Z87.19 HISTORY OF COLITIS: ICD-10-CM

## 2018-08-15 PROCEDURE — 25010000002 PROPOFOL 10 MG/ML EMULSION: Performed by: NURSE ANESTHETIST, CERTIFIED REGISTERED

## 2018-08-15 PROCEDURE — 25010000002 FENTANYL CITRATE (PF) 100 MCG/2ML SOLUTION: Performed by: NURSE ANESTHETIST, CERTIFIED REGISTERED

## 2018-08-15 PROCEDURE — 45380 COLONOSCOPY AND BIOPSY: CPT | Performed by: SURGERY

## 2018-08-15 PROCEDURE — 25010000002 PROPOFOL 1000 MG/ML EMULSION: Performed by: NURSE ANESTHETIST, CERTIFIED REGISTERED

## 2018-08-15 PROCEDURE — 25010000002 MIDAZOLAM PER 1 MG: Performed by: NURSE ANESTHETIST, CERTIFIED REGISTERED

## 2018-08-15 RX ORDER — SODIUM CHLORIDE, SODIUM LACTATE, POTASSIUM CHLORIDE, CALCIUM CHLORIDE 600; 310; 30; 20 MG/100ML; MG/100ML; MG/100ML; MG/100ML
125 INJECTION, SOLUTION INTRAVENOUS CONTINUOUS
Status: DISCONTINUED | OUTPATIENT
Start: 2018-08-15 | End: 2018-08-15 | Stop reason: HOSPADM

## 2018-08-15 RX ORDER — ONDANSETRON 2 MG/ML
4 INJECTION INTRAMUSCULAR; INTRAVENOUS ONCE AS NEEDED
Status: DISCONTINUED | OUTPATIENT
Start: 2018-08-15 | End: 2018-08-15 | Stop reason: HOSPADM

## 2018-08-15 RX ORDER — IPRATROPIUM BROMIDE AND ALBUTEROL SULFATE 2.5; .5 MG/3ML; MG/3ML
3 SOLUTION RESPIRATORY (INHALATION) ONCE AS NEEDED
Status: DISCONTINUED | OUTPATIENT
Start: 2018-08-15 | End: 2018-08-15 | Stop reason: HOSPADM

## 2018-08-15 RX ORDER — MIDAZOLAM HYDROCHLORIDE 1 MG/ML
INJECTION INTRAMUSCULAR; INTRAVENOUS AS NEEDED
Status: DISCONTINUED | OUTPATIENT
Start: 2018-08-15 | End: 2018-08-15 | Stop reason: SURG

## 2018-08-15 RX ORDER — LIDOCAINE HYDROCHLORIDE 20 MG/ML
INJECTION, SOLUTION INFILTRATION; PERINEURAL AS NEEDED
Status: DISCONTINUED | OUTPATIENT
Start: 2018-08-15 | End: 2018-08-15 | Stop reason: SURG

## 2018-08-15 RX ORDER — BUPRENORPHINE HYDROCHLORIDE AND NALOXONE HYDROCHLORIDE DIHYDRATE 8; 2 MG/1; MG/1
1 TABLET SUBLINGUAL DAILY
COMMUNITY

## 2018-08-15 RX ORDER — OXYCODONE HYDROCHLORIDE AND ACETAMINOPHEN 5; 325 MG/1; MG/1
1 TABLET ORAL ONCE AS NEEDED
Status: DISCONTINUED | OUTPATIENT
Start: 2018-08-15 | End: 2018-08-15 | Stop reason: HOSPADM

## 2018-08-15 RX ORDER — MEPERIDINE HYDROCHLORIDE 50 MG/ML
12.5 INJECTION INTRAMUSCULAR; INTRAVENOUS; SUBCUTANEOUS
Status: DISCONTINUED | OUTPATIENT
Start: 2018-08-15 | End: 2018-08-15 | Stop reason: HOSPADM

## 2018-08-15 RX ORDER — FENTANYL CITRATE 50 UG/ML
50 INJECTION, SOLUTION INTRAMUSCULAR; INTRAVENOUS
Status: DISCONTINUED | OUTPATIENT
Start: 2018-08-15 | End: 2018-08-15 | Stop reason: HOSPADM

## 2018-08-15 RX ORDER — FENTANYL CITRATE 50 UG/ML
INJECTION, SOLUTION INTRAMUSCULAR; INTRAVENOUS AS NEEDED
Status: DISCONTINUED | OUTPATIENT
Start: 2018-08-15 | End: 2018-08-15 | Stop reason: SURG

## 2018-08-15 RX ORDER — SODIUM CHLORIDE 0.9 % (FLUSH) 0.9 %
1-10 SYRINGE (ML) INJECTION AS NEEDED
Status: DISCONTINUED | OUTPATIENT
Start: 2018-08-15 | End: 2018-08-15 | Stop reason: HOSPADM

## 2018-08-15 RX ORDER — PROPOFOL 10 MG/ML
VIAL (ML) INTRAVENOUS AS NEEDED
Status: DISCONTINUED | OUTPATIENT
Start: 2018-08-15 | End: 2018-08-15 | Stop reason: SURG

## 2018-08-15 RX ADMIN — MIDAZOLAM HYDROCHLORIDE 2 MG: 1 INJECTION, SOLUTION INTRAMUSCULAR; INTRAVENOUS at 08:41

## 2018-08-15 RX ADMIN — SODIUM CHLORIDE, POTASSIUM CHLORIDE, SODIUM LACTATE AND CALCIUM CHLORIDE: 600; 310; 30; 20 INJECTION, SOLUTION INTRAVENOUS at 08:41

## 2018-08-15 RX ADMIN — LIDOCAINE HYDROCHLORIDE 40 MG: 20 INJECTION, SOLUTION INFILTRATION; PERINEURAL at 08:46

## 2018-08-15 RX ADMIN — PROPOFOL 160 MCG/KG/MIN: 10 INJECTION, EMULSION INTRAVENOUS at 08:46

## 2018-08-15 RX ADMIN — FENTANYL CITRATE 100 MCG: 50 INJECTION INTRAMUSCULAR; INTRAVENOUS at 08:41

## 2018-08-15 RX ADMIN — PROPOFOL 50 MG: 10 INJECTION, EMULSION INTRAVENOUS at 08:46

## 2018-08-15 NOTE — ANESTHESIA PREPROCEDURE EVALUATION
Anesthesia Evaluation     Patient summary reviewed and Nursing notes reviewed   no history of anesthetic complications:  NPO Solid Status: > 8 hours  NPO Liquid Status: > 8 hours           Airway   Mallampati: III  TM distance: >3 FB  Possible difficult intubation  Dental    (+) poor dentition    Pulmonary - normal exam    breath sounds clear to auscultation  (+) a smoker (chew) Current Abstained day of surgery, asthma, shortness of breath, sleep apnea on CPAP,   Cardiovascular - normal exam  Exercise tolerance: poor (<4 METS)    ECG reviewed  Rhythm: regular  Rate: normal    (+) hypertension, SMITH, hyperlipidemia,       Neuro/Psych  (+) headaches, numbness, psychiatric history (h/o suicidal ideations in the past) Anxiety,     GI/Hepatic/Renal/Endo    (+) obesity, morbid obesity, GI bleeding,     Musculoskeletal     Abdominal   (+) obese,    Substance History      OB/GYN          Other   (+) arthritis     ROS/Med Hx Other: H/O polysubstance abuse..the patient is on suboxone.                  Anesthesia Plan    ASA 3     general   total IV anesthesia  intravenous induction   Anesthetic plan and risks discussed with patient.    Plan discussed with CRNA.

## 2018-08-15 NOTE — H&P (VIEW-ONLY)
Chief Complaint   Patient presents with   • colitis   • strong family history of colon cancer     Avelino Watson is a 43 y.o. male who presents to the office today as a new patient for evaluation of colitis and strong family history of colon cancer.    HPI   Reports rectal bleeding for the past 2 weeks but has not been present with each stool. The blood changes the color of the toilet water and tissue is saturated after a BM. He thinks he does have hemorrhoids but is not sure. No rectal pain. He noticed some lower abdominal pain over the past few months. He noticed improvement in this pain after completing 10 day therapy of antibiotics (Cipro and Flagyl) given at the ED. He was evaluated at [facility] ED 5/27/2018 and had CT which showed colitis. He also reports that he has been taking pain medications for chronic back pain related to herniated discs and has been through several different medications over the past few years. He feels that this has changed his digestion. If he is off pain medications, he has more frequent and loose stools. He takes Protonix 40 mg once daily and states that this medication has helped with his stools and he has 1 BM per day currently.     Maternal grandmother had colon cancer and mother was diagnosed with colon cancer which was advanced at age 53.     Labs 5/27/2018:  Hemoglobin 14.0 - 18.0 g/dL 15.7    Hematocrit 42.0 - 52.0 % 46.5    MCV 80.0 - 94.0 fL 90.1      CT abd/pelv 5/27/2018:  FINDINGS:   The lung bases are clear. There are no pleural effusions.     The liver is homogeneous, but decreased in attenuation suggestive of  fatty infiltration of the liver.     The spleen is homogeneous.     There is no peripancreatic stranding or pancreatic head mass.     There is no adrenal enlargement.     The kidneys show no evidence of hydronephrosis or hydroureter. I do not  see any distal ureteral stones.      Otherwise I do not see any free fluid or walled off fluid collections.     There  is thickening of the ascending and transverse colon wall.     IMPRESSION:  Appearance suggestive of ascending and transverse colitis.    Review of Systems   Constitutional: Negative for chills, fatigue and fever.   HENT: Negative for congestion, sore throat and trouble swallowing.    Eyes: Negative.    Respiratory: Negative.    Cardiovascular: Positive for leg swelling. Negative for chest pain and palpitations.   Gastrointestinal: Positive for abdominal distention, abdominal pain, anal bleeding and blood in stool. Negative for constipation, diarrhea, nausea, rectal pain and vomiting.   Endocrine: Negative for cold intolerance and heat intolerance.   Genitourinary: Negative.    Musculoskeletal: Positive for arthralgias, back pain and myalgias.   Skin: Negative.    Allergic/Immunologic: Negative for environmental allergies and food allergies.   Neurological: Negative for dizziness and light-headedness.   Hematological: Does not bruise/bleed easily.   Psychiatric/Behavioral: Negative for sleep disturbance. The patient is nervous/anxious.      Past Medical History:   Diagnosis Date   • Anxiety    • Arthritis    • Asthma    • Chronic pain disorder    • Depression    • Hyperlipidemia    • Hypertension    • Injury of back    • Kidney stone    • Migraine    • Pancreatitis    • Sciatica    • Spinal stenosis      Past Surgical History:   Procedure Laterality Date   • COSMETIC SURGERY     • DENTAL PROCEDURE     • FRACTURE SURGERY      left arm humerous bone    • KNEE SURGERY       Family History   Problem Relation Age of Onset   • Colon cancer Mother    • Suicide Attempts Father    • Colon cancer Maternal Grandmother      Social History   Substance Use Topics   • Smoking status: Never Smoker   • Smokeless tobacco: Current User     Types: Snuff   • Alcohol use No      Comment: Drinks about 2 beers a month       CURRENT MEDICATION:  •  clonazePAM (KlonoPIN) 0.5 MG tablet, Take 1 tablet by mouth Every 8 (Eight) Hours As Needed  "for Seizures., Disp: 90 tablet, Rfl: 0  •  CloNIDine (CATAPRES) 0.2 MG tablet, Take 0.2 mg by mouth As Needed., Disp: , Rfl:   •  fenofibrate micronized (LOFIBRA) 134 MG capsule, Take 134 mg by mouth Every Morning Before Breakfast., Disp: , Rfl:   •  fluticasone (FLONASE) 50 MCG/ACT nasal spray, 2 sprays into each nostril Daily., Disp: , Rfl:   •  furosemide (LASIX) 20 MG tablet, Take 20 mg by mouth As Needed., Disp: , Rfl: 1  •  gabapentin (NEURONTIN) 600 MG tablet, Take 600 mg by mouth 3 (Three) Times a Day., Disp: , Rfl:   •  mirtazapine (REMERON) 45 MG tablet, Take 1 tablet by mouth Every Night., Disp: 30 tablet, Rfl: 0  •  pantoprazole (PROTONIX) 40 MG EC tablet, Take 1 tablet by mouth Daily., Disp: 30 tablet, Rfl: 0  •  potassium chloride (MICRO-K) 10 MEQ CR capsule, Take 10 mEq by mouth As Needed., Disp: , Rfl: 1  •  Testosterone Cypionate (DEPOTESTOTERONE CYPIONATE) 200 MG/ML injection, Inject 200 mg into the shoulder, thigh, or buttocks Every 28 (Twenty-Eight) Days., Disp: , Rfl:     ALLERGIES:  Bee venom; Indomethacin; Allopurinol; and Uloric [febuxostat]    VISIT VITALS:  /93   Pulse 80   Ht 182.9 cm (72\")   Wt (!) 140 kg (308 lb 12.8 oz)   SpO2 94%   BMI 41.88 kg/m²     Physical Exam   Constitutional: He is oriented to person, place, and time. He appears well-developed and well-nourished. No distress.   obese   HENT:   Head: Normocephalic and atraumatic.   Nose: Nose normal.   Mouth/Throat: Oropharynx is clear and moist.   Hoarse voice   Eyes: Conjunctivae are normal. Right eye exhibits no discharge. Left eye exhibits no discharge. No scleral icterus.   Neck: Normal range of motion. No JVD present.   Cardiovascular: Normal rate, regular rhythm and normal heart sounds.  Exam reveals no gallop and no friction rub.    No murmur heard.  Pulmonary/Chest: Effort normal and breath sounds normal. No respiratory distress. He has no wheezes. He has no rales. He exhibits no tenderness.   Abdominal: Soft. " Bowel sounds are normal. He exhibits no mass. There is tenderness (mild, generalized).   Musculoskeletal: Normal range of motion. He exhibits no edema or deformity.   Neurological: He is alert and oriented to person, place, and time. Coordination normal.   Skin: Skin is warm and dry. No rash noted. He is not diaphoretic. There is erythema (mild).   tattoos   Psychiatric: He has a normal mood and affect. His behavior is normal. Judgment and thought content normal.   Slow speech   Vitals reviewed.      Assessment/Plan     1. Rectal bleeding    2. Family history of colon cancer    3. History of colitis      Orders Placed This Encounter   Procedures   • Follow Anesthesia Guidelines / Standing Orders     COLONOSCOPY CPT CODE: 74978 (N/A)  He will need a colonoscopy performed with IV general sedation. All of the risks, benefits and alternatives of this procedure have been discussed with him, all of his questions have been answered and he has elected to proceed. He should follow up in the office after this procedure to discuss the results and further recommendations can be made at that time.    New Medications Ordered This Visit   Medications   • polyethylene glycol (GoLYTELY) 236 g solution     Sig: Starting at 6pm the day before procedure, drink 8 ounces every 30 minutes until all gone or stools are clear. May add flavor packet.     Dispense:  4000 mL     Refill:  0     We discussed differential diagnoses of his current complaints. He is worried about having colon cancer and would like his procedure to be performed as soon as possible. He also states that he would be interested in hemorrhoidal banding procedure during colonoscopy if he has internal hemorrhoids that need to be removed.     Patient's Body mass index is 41.88 kg/m². BMI is above normal parameters. Recommendations include: educational material.          Return for follow up after procedure to discuss results.      Electronically signed 7/23/2018 2:02  KIERA Lindsay PA-C, [facility]

## 2018-08-15 NOTE — ANESTHESIA POSTPROCEDURE EVALUATION
Patient: Avelino Watson    Procedure Summary     Date:  08/15/18 Room / Location:  [facility] OR [address] [place] [address] COR OR    Anesthesia Start:  0841 Anesthesia Stop:  0908    Procedure:  COLONOSCOPY CPT CODE: 22821 (N/A ) Diagnosis:       Rectal bleeding      Family history of colon cancer      History of colitis      (Rectal bleeding [K62.5])      (Family history of colon cancer [Z80.0])      (History of colitis [Z87.19])    Surgeon:  Roshan Peres MD Provider:  Walter Horan DO    Anesthesia Type:  general ASA Status:  3          Anesthesia Type: general  Last vitals  BP   139/88 (08/15/18 0938)   Temp   97.5 °F (36.4 °C) (08/15/18 0908)   Pulse   76 (08/15/18 0938)   Resp   18 (08/15/18 0938)     SpO2   95 % (08/15/18 0938)     Post Anesthesia Care and Evaluation    Patient location during evaluation: PHASE II  Patient participation: complete - patient participated  Level of consciousness: awake and alert  Pain score: 1  Pain management: adequate  Airway patency: patent  Anesthetic complications: No anesthetic complications  PONV Status: controlled  Cardiovascular status: acceptable  Respiratory status: acceptable  Hydration status: acceptable

## 2018-08-15 NOTE — OP NOTE
COLONOSCOPY  Procedure Note    Avelino Watson  8/15/2018    Pre-op Diagnosis:   Rectal bleeding [K62.5]  Family history of colon cancer [Z80.0]  History of colitis [Z87.19]    Post-op Diagnosis:   Hemorrhoids, benign-appearing colon polyps    Indications: See above    Procedure(s):  COLONOSCOPY CPT CODE: 43369    Surgeon(s):  Roshan Peres MD    Anesthesia: General    Staff:   Circulator: Berna Caldera RN  Endo Technician: Konstantin Navarro    Findings: Mild internal hemorrhoids, descending colon polyp, cecal polyp    Operative Procedure: The patient was taken to the operating suite and placed in left lateral decubitus position.  Bilateral sequential compression devices were in place and IV anesthesia was administered.  Timeout procedure was performed.  Digital rectal examination was negative, prostate normal.  The colonoscope was inserted and advanced to the cecum as evidenced by the ileocecal valve and appendiceal orifice.  The bowel prep was very good.  The colonoscope was slowly removed and the entirety of the colonic mucosa was evaluated.  Colonoscopic findings included colon polyps and internal hemorrhoids.  Patient had palpable internal hemorrhoids with no gross blood.  The cecum had a benign-appearing polyp that was removed with snare polypectomy.  The descending colon had a similar polyp also removed with snare polypectomy.  The remainder of the colonic mucosa was within normal limits..  The colonoscope was then removed and the patient was awakened from anesthesia and taken recovery.  They tolerated the procedure well.    Estimated Blood Loss: minimal    Specimens:  Cecal polyp, descending colon polyp                  Drains: none    Grafts or Implants: none    Complications: None    Recommendations: screening colonoscopy in 10 years pending pathology    Roshan Peres MD     Date: 8/15/2018  Time: 9:12 AM

## 2018-08-17 LAB
LAB AP CASE REPORT: NORMAL
PATH REPORT.FINAL DX SPEC: NORMAL

## 2020-05-27 ENCOUNTER — DOCUMENTATION (OUTPATIENT)
Dept: GASTROENTEROLOGY | Facility: CLINIC | Age: 45
End: 2020-05-27

## 2020-05-27 NOTE — PROGRESS NOTES
I tried to call patient x3 at numbers listed in chart to ask him screening colonoscopy questions and I am unable to reach and unable to leave a message.

## 2020-06-04 ENCOUNTER — DOCUMENTATION (OUTPATIENT)
Dept: GASTROENTEROLOGY | Facility: CLINIC | Age: 45
End: 2020-06-04

## 2021-03-23 ENCOUNTER — BULK ORDERING (OUTPATIENT)
Dept: CASE MANAGEMENT | Facility: OTHER | Age: 46
End: 2021-03-23

## 2021-03-23 DIAGNOSIS — Z23 IMMUNIZATION DUE: ICD-10-CM

## (undated) DEVICE — SYR LUERLOK 30CC

## (undated) DEVICE — THE SINGLE USE ETRAP – POLYP TRAP IS USED FOR SUCTION RETRIEVAL OF ENDOSCOPICALLY REMOVED POLYPS.: Brand: ETRAP

## (undated) DEVICE — Device: Brand: DEFENDO AIR/WATER/SUCTION AND BIOPSY VALVE

## (undated) DEVICE — SNAR POLYP CAPTIFLX MICRO OVL 13MM 240CM

## (undated) DEVICE — SUCTION CANISTER, 1500CC, RIGID: Brand: DEROYAL

## (undated) DEVICE — CONN Y IRR DISP 1P/U

## (undated) DEVICE — Device

## (undated) DEVICE — TUBING, SUCTION, 1/4" X 20', STRAIGHT: Brand: MEDLINE INDUSTRIES, INC.

## (undated) DEVICE — Device: Brand: ENDOGATOR

## (undated) DEVICE — GOWN,REINF,POLY,ECL,PP SLV,XL: Brand: MEDLINE